# Patient Record
Sex: MALE | Race: WHITE | NOT HISPANIC OR LATINO | URBAN - METROPOLITAN AREA
[De-identification: names, ages, dates, MRNs, and addresses within clinical notes are randomized per-mention and may not be internally consistent; named-entity substitution may affect disease eponyms.]

---

## 2017-03-21 ENCOUNTER — INPATIENT (INPATIENT)
Facility: HOSPITAL | Age: 67
LOS: 2 days | Discharge: HOME HEALTH | End: 2017-03-24
Attending: ORTHOPAEDIC SURGERY

## 2017-06-27 DIAGNOSIS — R05 COUGH: ICD-10-CM

## 2017-06-27 DIAGNOSIS — E03.9 HYPOTHYROIDISM, UNSPECIFIED: ICD-10-CM

## 2017-06-27 DIAGNOSIS — R00.0 TACHYCARDIA, UNSPECIFIED: ICD-10-CM

## 2017-06-27 DIAGNOSIS — Z53.29 PROCEDURE AND TREATMENT NOT CARRIED OUT BECAUSE OF PATIENT'S DECISION FOR OTHER REASONS: ICD-10-CM

## 2017-06-27 DIAGNOSIS — R71.0 PRECIPITOUS DROP IN HEMATOCRIT: ICD-10-CM

## 2017-06-27 DIAGNOSIS — F31.9 BIPOLAR DISORDER, UNSPECIFIED: ICD-10-CM

## 2017-06-27 DIAGNOSIS — M16.11 UNILATERAL PRIMARY OSTEOARTHRITIS, RIGHT HIP: ICD-10-CM

## 2017-06-27 DIAGNOSIS — M10.9 GOUT, UNSPECIFIED: ICD-10-CM

## 2017-06-27 DIAGNOSIS — I10 ESSENTIAL (PRIMARY) HYPERTENSION: ICD-10-CM

## 2017-06-27 DIAGNOSIS — Z87.891 PERSONAL HISTORY OF NICOTINE DEPENDENCE: ICD-10-CM

## 2017-06-27 DIAGNOSIS — M48.00 SPINAL STENOSIS, SITE UNSPECIFIED: ICD-10-CM

## 2018-05-29 PROBLEM — Z00.00 ENCOUNTER FOR PREVENTIVE HEALTH EXAMINATION: Status: ACTIVE | Noted: 2018-05-29

## 2018-06-14 ENCOUNTER — APPOINTMENT (OUTPATIENT)
Dept: PLASTIC SURGERY | Facility: CLINIC | Age: 68
End: 2018-06-14
Payer: MEDICARE

## 2018-06-14 ENCOUNTER — FORM ENCOUNTER (OUTPATIENT)
Age: 68
End: 2018-06-14

## 2018-06-14 VITALS — HEIGHT: 70 IN | BODY MASS INDEX: 26.48 KG/M2 | WEIGHT: 185 LBS

## 2018-06-14 DIAGNOSIS — M19.90 UNSPECIFIED OSTEOARTHRITIS, UNSPECIFIED SITE: ICD-10-CM

## 2018-06-14 PROCEDURE — 99203 OFFICE O/P NEW LOW 30 MIN: CPT

## 2018-06-15 ENCOUNTER — OUTPATIENT (OUTPATIENT)
Dept: OUTPATIENT SERVICES | Facility: HOSPITAL | Age: 68
LOS: 1 days | Discharge: HOME | End: 2018-06-15

## 2018-06-15 DIAGNOSIS — M19.90 UNSPECIFIED OSTEOARTHRITIS, UNSPECIFIED SITE: ICD-10-CM

## 2018-07-17 ENCOUNTER — APPOINTMENT (OUTPATIENT)
Dept: PLASTIC SURGERY | Facility: CLINIC | Age: 68
End: 2018-07-17
Payer: MEDICARE

## 2018-07-17 PROCEDURE — 99212 OFFICE O/P EST SF 10 MIN: CPT

## 2018-09-04 ENCOUNTER — APPOINTMENT (OUTPATIENT)
Dept: PLASTIC SURGERY | Facility: CLINIC | Age: 68
End: 2018-09-04

## 2023-06-18 ENCOUNTER — INPATIENT (INPATIENT)
Facility: HOSPITAL | Age: 73
LOS: 0 days | Discharge: HOME | DRG: 69 | End: 2023-06-19
Attending: STUDENT IN AN ORGANIZED HEALTH CARE EDUCATION/TRAINING PROGRAM | Admitting: PSYCHIATRY & NEUROLOGY
Payer: MEDICARE

## 2023-06-18 ENCOUNTER — TRANSCRIPTION ENCOUNTER (OUTPATIENT)
Age: 73
End: 2023-06-18

## 2023-06-18 VITALS
TEMPERATURE: 99 F | SYSTOLIC BLOOD PRESSURE: 161 MMHG | RESPIRATION RATE: 20 BRPM | WEIGHT: 172.4 LBS | OXYGEN SATURATION: 96 % | HEART RATE: 64 BPM | DIASTOLIC BLOOD PRESSURE: 77 MMHG

## 2023-06-18 DIAGNOSIS — I63.9 CEREBRAL INFARCTION, UNSPECIFIED: ICD-10-CM

## 2023-06-18 DIAGNOSIS — Z98.890 OTHER SPECIFIED POSTPROCEDURAL STATES: Chronic | ICD-10-CM

## 2023-06-18 LAB
ALBUMIN SERPL ELPH-MCNC: 4.4 G/DL — SIGNIFICANT CHANGE UP (ref 3.5–5.2)
ALP SERPL-CCNC: 78 U/L — SIGNIFICANT CHANGE UP (ref 30–115)
ALT FLD-CCNC: 19 U/L — SIGNIFICANT CHANGE UP (ref 0–41)
AMMONIA BLD-MCNC: 19 UMOL/L — SIGNIFICANT CHANGE UP (ref 11–55)
ANION GAP SERPL CALC-SCNC: 10 MMOL/L — SIGNIFICANT CHANGE UP (ref 7–14)
APTT BLD: 38.5 SEC — SIGNIFICANT CHANGE UP (ref 27–39.2)
AST SERPL-CCNC: 25 U/L — SIGNIFICANT CHANGE UP (ref 0–41)
BASOPHILS # BLD AUTO: 0.05 K/UL — SIGNIFICANT CHANGE UP (ref 0–0.2)
BASOPHILS NFR BLD AUTO: 0.6 % — SIGNIFICANT CHANGE UP (ref 0–1)
BILIRUB SERPL-MCNC: 0.4 MG/DL — SIGNIFICANT CHANGE UP (ref 0.2–1.2)
BUN SERPL-MCNC: 16 MG/DL — SIGNIFICANT CHANGE UP (ref 10–20)
CALCIUM SERPL-MCNC: 9.3 MG/DL — SIGNIFICANT CHANGE UP (ref 8.4–10.4)
CHLORIDE SERPL-SCNC: 103 MMOL/L — SIGNIFICANT CHANGE UP (ref 98–110)
CO2 SERPL-SCNC: 25 MMOL/L — SIGNIFICANT CHANGE UP (ref 17–32)
CREAT SERPL-MCNC: 0.9 MG/DL — SIGNIFICANT CHANGE UP (ref 0.7–1.5)
EGFR: 92 ML/MIN/1.73M2 — SIGNIFICANT CHANGE UP
EOSINOPHIL # BLD AUTO: 0.26 K/UL — SIGNIFICANT CHANGE UP (ref 0–0.7)
EOSINOPHIL NFR BLD AUTO: 3 % — SIGNIFICANT CHANGE UP (ref 0–8)
ETHANOL SERPL-MCNC: <10 MG/DL — SIGNIFICANT CHANGE UP
GLUCOSE SERPL-MCNC: 99 MG/DL — SIGNIFICANT CHANGE UP (ref 70–99)
HCT VFR BLD CALC: 44.6 % — SIGNIFICANT CHANGE UP (ref 42–52)
HGB BLD-MCNC: 15.3 G/DL — SIGNIFICANT CHANGE UP (ref 14–18)
IMM GRANULOCYTES NFR BLD AUTO: 0.3 % — SIGNIFICANT CHANGE UP (ref 0.1–0.3)
INR BLD: 1.06 RATIO — SIGNIFICANT CHANGE UP (ref 0.65–1.3)
LACTATE SERPL-SCNC: 1 MMOL/L — SIGNIFICANT CHANGE UP (ref 0.7–2)
LYMPHOCYTES # BLD AUTO: 0.91 K/UL — LOW (ref 1.2–3.4)
LYMPHOCYTES # BLD AUTO: 10.5 % — LOW (ref 20.5–51.1)
MCHC RBC-ENTMCNC: 30.7 PG — SIGNIFICANT CHANGE UP (ref 27–31)
MCHC RBC-ENTMCNC: 34.3 G/DL — SIGNIFICANT CHANGE UP (ref 32–37)
MCV RBC AUTO: 89.6 FL — SIGNIFICANT CHANGE UP (ref 80–94)
MONOCYTES # BLD AUTO: 0.53 K/UL — SIGNIFICANT CHANGE UP (ref 0.1–0.6)
MONOCYTES NFR BLD AUTO: 6.1 % — SIGNIFICANT CHANGE UP (ref 1.7–9.3)
NEUTROPHILS # BLD AUTO: 6.85 K/UL — HIGH (ref 1.4–6.5)
NEUTROPHILS NFR BLD AUTO: 79.5 % — HIGH (ref 42.2–75.2)
NRBC # BLD: 0 /100 WBCS — SIGNIFICANT CHANGE UP (ref 0–0)
PLATELET # BLD AUTO: 280 K/UL — SIGNIFICANT CHANGE UP (ref 130–400)
PMV BLD: 9.4 FL — SIGNIFICANT CHANGE UP (ref 7.4–10.4)
POTASSIUM SERPL-MCNC: 4.2 MMOL/L — SIGNIFICANT CHANGE UP (ref 3.5–5)
POTASSIUM SERPL-SCNC: 4.2 MMOL/L — SIGNIFICANT CHANGE UP (ref 3.5–5)
PROT SERPL-MCNC: 7 G/DL — SIGNIFICANT CHANGE UP (ref 6–8)
PROTHROM AB SERPL-ACNC: 12.1 SEC — SIGNIFICANT CHANGE UP (ref 9.95–12.87)
RBC # BLD: 4.98 M/UL — SIGNIFICANT CHANGE UP (ref 4.7–6.1)
RBC # FLD: 12.6 % — SIGNIFICANT CHANGE UP (ref 11.5–14.5)
SODIUM SERPL-SCNC: 138 MMOL/L — SIGNIFICANT CHANGE UP (ref 135–146)
TROPONIN T SERPL-MCNC: <0.01 NG/ML — SIGNIFICANT CHANGE UP
WBC # BLD: 8.63 K/UL — SIGNIFICANT CHANGE UP (ref 4.8–10.8)
WBC # FLD AUTO: 8.63 K/UL — SIGNIFICANT CHANGE UP (ref 4.8–10.8)

## 2023-06-18 PROCEDURE — 0042T: CPT | Mod: MA

## 2023-06-18 PROCEDURE — 70551 MRI BRAIN STEM W/O DYE: CPT | Mod: 26

## 2023-06-18 PROCEDURE — 83036 HEMOGLOBIN GLYCOSYLATED A1C: CPT

## 2023-06-18 PROCEDURE — 80061 LIPID PANEL: CPT

## 2023-06-18 PROCEDURE — 81003 URINALYSIS AUTO W/O SCOPE: CPT

## 2023-06-18 PROCEDURE — 80307 DRUG TEST PRSMV CHEM ANLYZR: CPT | Mod: 59

## 2023-06-18 PROCEDURE — 80048 BASIC METABOLIC PNL TOTAL CA: CPT

## 2023-06-18 PROCEDURE — 80349 CANNABINOIDS NATURAL: CPT

## 2023-06-18 PROCEDURE — 86803 HEPATITIS C AB TEST: CPT

## 2023-06-18 PROCEDURE — 36415 COLL VENOUS BLD VENIPUNCTURE: CPT

## 2023-06-18 PROCEDURE — 70498 CT ANGIOGRAPHY NECK: CPT | Mod: 26,MA

## 2023-06-18 PROCEDURE — 80354 DRUG SCREENING FENTANYL: CPT

## 2023-06-18 PROCEDURE — 70551 MRI BRAIN STEM W/O DYE: CPT | Mod: MG

## 2023-06-18 PROCEDURE — 70496 CT ANGIOGRAPHY HEAD: CPT | Mod: 26,MA

## 2023-06-18 PROCEDURE — 93306 TTE W/DOPPLER COMPLETE: CPT

## 2023-06-18 PROCEDURE — 99291 CRITICAL CARE FIRST HOUR: CPT | Mod: GC

## 2023-06-18 PROCEDURE — 93010 ELECTROCARDIOGRAM REPORT: CPT

## 2023-06-18 PROCEDURE — G1004: CPT

## 2023-06-18 PROCEDURE — 84443 ASSAY THYROID STIM HORMONE: CPT

## 2023-06-18 PROCEDURE — 71045 X-RAY EXAM CHEST 1 VIEW: CPT | Mod: 26

## 2023-06-18 PROCEDURE — 92610 EVALUATE SWALLOWING FUNCTION: CPT | Mod: GN

## 2023-06-18 RX ORDER — ENOXAPARIN SODIUM 100 MG/ML
40 INJECTION SUBCUTANEOUS EVERY 24 HOURS
Refills: 0 | Status: DISCONTINUED | OUTPATIENT
Start: 2023-06-18 | End: 2023-06-19

## 2023-06-18 RX ORDER — SERTRALINE 25 MG/1
1 TABLET, FILM COATED ORAL
Refills: 0 | DISCHARGE

## 2023-06-18 RX ORDER — ASPIRIN/CALCIUM CARB/MAGNESIUM 324 MG
325 TABLET ORAL ONCE
Refills: 0 | Status: COMPLETED | OUTPATIENT
Start: 2023-06-18 | End: 2023-06-18

## 2023-06-18 RX ORDER — ATORVASTATIN CALCIUM 80 MG/1
80 TABLET, FILM COATED ORAL AT BEDTIME
Refills: 0 | Status: DISCONTINUED | OUTPATIENT
Start: 2023-06-18 | End: 2023-06-19

## 2023-06-18 RX ORDER — LEVOTHYROXINE SODIUM 125 MCG
1 TABLET ORAL
Refills: 0 | DISCHARGE

## 2023-06-18 RX ORDER — THIAMINE MONONITRATE (VIT B1) 100 MG
100 TABLET ORAL DAILY
Refills: 0 | Status: DISCONTINUED | OUTPATIENT
Start: 2023-06-18 | End: 2023-06-19

## 2023-06-18 RX ORDER — ASPIRIN/CALCIUM CARB/MAGNESIUM 324 MG
81 TABLET ORAL DAILY
Refills: 0 | Status: DISCONTINUED | OUTPATIENT
Start: 2023-06-19 | End: 2023-06-19

## 2023-06-18 RX ORDER — TAMSULOSIN HYDROCHLORIDE 0.4 MG/1
1 CAPSULE ORAL
Refills: 0 | DISCHARGE

## 2023-06-18 RX ORDER — CLOPIDOGREL BISULFATE 75 MG/1
300 TABLET, FILM COATED ORAL ONCE
Refills: 0 | Status: COMPLETED | OUTPATIENT
Start: 2023-06-18 | End: 2023-06-18

## 2023-06-18 RX ORDER — CLOPIDOGREL BISULFATE 75 MG/1
75 TABLET, FILM COATED ORAL DAILY
Refills: 0 | Status: DISCONTINUED | OUTPATIENT
Start: 2023-06-19 | End: 2023-06-19

## 2023-06-18 RX ADMIN — ATORVASTATIN CALCIUM 80 MILLIGRAM(S): 80 TABLET, FILM COATED ORAL at 21:41

## 2023-06-18 RX ADMIN — CLOPIDOGREL BISULFATE 300 MILLIGRAM(S): 75 TABLET, FILM COATED ORAL at 17:00

## 2023-06-18 RX ADMIN — Medication 100 MILLIGRAM(S): at 21:41

## 2023-06-18 RX ADMIN — Medication 325 MILLIGRAM(S): at 16:59

## 2023-06-18 NOTE — DISCHARGE NOTE NURSING/CASE MANAGEMENT/SOCIAL WORK - PATIENT PORTAL LINK FT
You can access the FollowMyHealth Patient Portal offered by [facility] by registering at the following website: [email or web address]. By joining [employer]’s FollowMyHealth portal, you will also be able to view your health information using other applications (apps) compatible with our system.

## 2023-06-18 NOTE — ED PROVIDER NOTE - PHYSICAL EXAMINATION
CONSTITUTIONAL: Well-developed; well-nourished; in no acute distress, nontoxic appearing  SKIN: skin exam is warm and dry,  HEAD: Normocephalic; atraumatic.  EYES:  conjunctiva and sclera clear.  ENT: MMM   NECK:  ROM intact.  CARD: S1, S2 normal, no murmur  RESP: No increased WOB   EXT: Normal ROM   NEURO: awake, alert, aphasia, +NIH 3.

## 2023-06-18 NOTE — ED ADULT NURSE NOTE - NSFALLHARMRISKINTERV_ED_ALL_ED
Assistance OOB with selected safe patient handling equipment if applicable/Assistance with ambulation/Communicate risk of Fall with Harm to all staff, patient, and family/Encourage patient to sit up slowly, dangle for a short time, stand at bedside before walking/Monitor gait and stability/Monitor for mental status changes and reorient to person, place, and time, as needed/Move patient closer to nursing station/within visual sight of ED staff/Provide visual cue: red socks, yellow wristband, yellow gown, etc/Reinforce activity limits and safety measures with patient and family/Review medications for side effects contributing to fall risk/Toileting schedule using arm’s reach rule for commode and bathroom/Use of alarms - bed, stretcher, chair and/or video monitoring/Bed in lowest position, wheels locked, appropriate side rails in place/Call bell, personal items and telephone in reach/Instruct patient to call for assistance before getting out of bed/chair/stretcher/Non-slip footwear applied when patient is off stretcher/[place] to call system/Physically safe environment - no spills, clutter or unnecessary equipment/Purposeful Proactive Rounding/Room/bathroom lighting operational, light cord in reach Assistance OOB with selected safe patient handling equipment if applicable/Assistance with ambulation/Communicate risk of Fall with Harm to all staff, patient, and family/Monitor gait and stability/Monitor for mental status changes and reorient to person, place, and time, as needed/Move patient closer to nursing station/within visual sight of ED staff/Provide visual cue: red socks, yellow wristband, yellow gown, etc/Reinforce activity limits and safety measures with patient and family/Toileting schedule using arm’s reach rule for commode and bathroom/Use of alarms - bed, stretcher, chair and/or video monitoring/Bed in lowest position, wheels locked, appropriate side rails in place/Call bell, personal items and telephone in reach/Instruct patient to call for assistance before getting out of bed/chair/stretcher/Non-slip footwear applied when patient is off stretcher/[place] to call system/Physically safe environment - no spills, clutter or unnecessary equipment/Purposeful Proactive Rounding/Room/bathroom lighting operational, light cord in reach

## 2023-06-18 NOTE — ED PROVIDER NOTE - CLINICAL SUMMARY MEDICAL DECISION MAKING FREE TEXT BOX
72-year-old male presented to the emergency department as a stroke notification.  Patient had CT which was concerning for stroke he was consulted and recommended admission for further evaluation and management.  Patient is out of the window for tPA.

## 2023-06-18 NOTE — H&P ADULT - NSHPPHYSICALEXAM_GEN_ALL_CORE
Cognitive:  Alert, awake, was not able to answer the month, year or his age, but he said his name, and he is a hospital. He has moderate expressive aphasia with paraphasic errors.     Cranial Nerves:  II: Full to confrontation. III/IV/VI: PERRL EOMF No nystagmus  V1V2V3: Symmetric, VII: Face appears symmetric VIII: Normal to screening, IX/X: Palate Elevates Symmetrical  XI: Trapezius Symmetric  XII: Tongue midline  Motor:  Power: 5/5 throughout, tone: normal x 4 limbs, no tremor   Sensation:  Intact to light touch. Intact to pinprick/temperature and vibration. No neglect  Coordination: Finger-nose-finger intact.  Reflexes:  DTR: 2+ symmetric all 4 limbs, no clonus  Plantar responses: Down bilaterally  Gait: Normal

## 2023-06-18 NOTE — H&P ADULT - ASSESSMENT
This 72y Male with PMH of hypothyroidism, HTN presenting with aphasia. The patient acute symptoms suggest stroke. CTH was not significant, CTP showed perfusion restriction in the Lt MCA territory, CTA no LVO. He was out of the window for Tenecteplase. No intervention. Intoxication is less likely      Neuro  #Stroke workup  - Load with aspirin 325 mg, and clopidogrel 300 mg   - continue aspirin 81mg and plavix 75mg daily  - continue atorvastatin 80mg daily  - q4hr stroke neuro checks and vitals  - obtain MRI Brain without contrast  - Stroke Code HCT Results: reviewed   - Stroke Code CTA Results: reviewed   - Stroke education  - Thiamin 100 mg daily   - Acohol level  - Utox     Cards  #HTN  - permissive hypertension, Goal -220  - hold home blood pressure medication for now  - obtain TTE with bubble  - Stroke Code EKG Results: reviewed     #HLD  - high dose statin as above in CVA  - LDL results: pending     Pulm  - call provider if SPO2 < 94%    GI  #Nutrition/Fluids/Electrolytes   - replete K<4 and Mg <2  - Diet: pending swallow assessement   - IVF: none     Renal  - Daily BMP    Infectious Disease  - Stroke Code CXR results:     Endocrine  - A1C results: pending     - TSH results: pending     DVT Prophylaxis  - lovenox sq for DVT prophylaxis   - SCDs for DVT prophylaxis          Discussed daily hospital plans and goals with patient and family at bedside.     Discussed with Neurology Attending

## 2023-06-18 NOTE — STROKE CODE NOTE - CT PERFORMED
Dr. Audi Pinto, we have 1- 2mg Ozempic and 1(7 tablets)- 10 mg jardiance. Is this okay to give to pt?
Pending samples for signature
18-Jun-2023 14:59

## 2023-06-18 NOTE — ED ADULT NURSE NOTE - OBJECTIVE STATEMENT
pt brought to ED by son for headache, vomiting, pt has a new onset of disorientation, unable to recognize his son, pt has difficulty speaking, believes we are in the year 1936. as per son, Pt is able to state his name and . pt began to become confused at 12:15 this afternoon.

## 2023-06-18 NOTE — H&P ADULT - HISTORY OF PRESENT ILLNESS
This 72y old male with PMH of hypothyroidism, alcoholic, presented with confusion. As per the son, he came to pick him he noticed that he is not able to finish his sentences, and he was finding difficulty opening the car door. As per the wife, the patient woke up around 7:30 am, and he was normal, about 9:30 am was the last time he had a conversation with her, then about 12:30 pm when the son came to pick him he noticed the problem.   No previous similar history   He denied weakness, numbness, blurry vision, head or neck trauma

## 2023-06-18 NOTE — ED PROVIDER NOTE - OBJECTIVE STATEMENT
72 year old male, past medical history hypothyroidism, hyperlipidemia, who presents with ams. patient with increased stress, noted to be c/o headache at 1215 today prompting ed eval. patient had witnessed unsteady gait and difficulty opening door handle by son. no fever, shortness of breath, vomiting, diarrhea, rash.

## 2023-06-18 NOTE — H&P ADULT - ATTENDING COMMENTS
Pt is a 73 yo M with PMhx EtOH use (reports h/o heavy use, now ~3 beers a week), former tobacco use, HTN, hypothyroidism, who presents with transient aphasia and right hand clumsiness. Now back to baseline, NIHSS 0. Note some b/l tremulousness and pt is diaphoretic. CTA head/neck without significant flow limiting stenosis. MRI brain negative for acute ischemia.    Impr: TIA  TTE pending  May downgrade to 3E   PTA: none-> DAPT x 21 days then ASA monotherapy in addition to high intensity statin.  [facility] protocol  Dispo- likely home once echo completed

## 2023-06-18 NOTE — H&P ADULT - NSHPLABSRESULTS_GEN_ALL_CORE
LABS:  cret                        15.3   8.63  )-----------( 280      ( 18 Jun 2023 15:05 )             44.6     06-18    138  |  103  |  16  ----------------------------<  99  4.2   |  25  |  0.9    Ca    9.3      18 Jun 2023 15:05    TPro  7.0  /  Alb  4.4  /  TBili  0.4  /  DBili  x   /  AST  25  /  ALT  19  /  AlkPhos  78  06-18    PT/INR - ( 18 Jun 2023 15:05 )   PT: 12.10 sec;   INR: 1.06 ratio         PTT - ( 18 Jun 2023 15:05 )  PTT:38.5 sec

## 2023-06-18 NOTE — ED ADULT TRIAGE NOTE - CHIEF COMPLAINT QUOTE
pt brought to ED by son for headache, vomiting, pt has a new onset of disorientation, unable to recognize his son, pt has difficulty speaking, believes we are in the year 1938. as per son, pt began to become confused at 12:15 this afternoon

## 2023-06-18 NOTE — ED PROVIDER NOTE - ATTENDING CONTRIBUTION TO CARE
72-year-old male with past medical history of hypothyroidism and hyperlipidemia presents emergency department for altered mental status.  Patient's father is 98 his die in the hospital and said he has been much more stressed.  According to the son when he went to pick him up at 1215 he was more anxious complaining of a headache.  They were walking to the car and his father seemed unsteady and was unable to open the door handle.  Patient is normally alert and oriented x3. Son states that patient is tremulous.     Eyes: PERRL, no conjunctival injection  HENT:  Neck supple without meningismus   CV: RRR, Warm, well-perfused extremities  RESP: CTA B/L, no tachypnea   GI: soft, non-tender, non-distended  MSK: No gross deformities appreciated  Skin: Warm, dry. No rashes  Neuro: Alert, CNs II-XII grossly intact. Sensation and motor function of extremities grossly intact.  Psych: Appropriate mood and affect.

## 2023-06-18 NOTE — PATIENT PROFILE ADULT - FUNCTIONAL ASSESSMENT - BASIC MOBILITY 6.
3-calculated by average/Not able to assess (calculate score using [facility] averaging method)

## 2023-06-18 NOTE — PATIENT PROFILE ADULT - FALL HARM RISK - HARM RISK INTERVENTIONS
Assistance with ambulation/Assistance OOB with selected safe patient handling equipment/Communicate Risk of Fall with Harm to all staff/Discuss with provider need for PT consult/Monitor gait and stability/Reinforce activity limits and safety measures with patient and family/Tailored Fall Risk Interventions/Visual Cue: Yellow wristband and red socks/Bed in lowest position, wheels locked, appropriate side rails in place/Call bell, personal items and telephone in reach/Instruct patient to call for assistance before getting out of bed or chair/Non-slip footwear when patient is out of bed/[place] to call system/Physically safe environment - no spills, clutter or unnecessary equipment/Purposeful Proactive Rounding/Room/bathroom lighting operational, light cord in reach

## 2023-06-19 ENCOUNTER — TRANSCRIPTION ENCOUNTER (OUTPATIENT)
Age: 73
End: 2023-06-19

## 2023-06-19 VITALS
TEMPERATURE: 98 F | SYSTOLIC BLOOD PRESSURE: 118 MMHG | HEART RATE: 70 BPM | OXYGEN SATURATION: 97 % | RESPIRATION RATE: 18 BRPM | DIASTOLIC BLOOD PRESSURE: 76 MMHG

## 2023-06-19 LAB
A1C WITH ESTIMATED AVERAGE GLUCOSE RESULT: 5.6 % — SIGNIFICANT CHANGE UP (ref 4–5.6)
AMPHET UR-MCNC: NEGATIVE — SIGNIFICANT CHANGE UP
ANION GAP SERPL CALC-SCNC: 9 MMOL/L — SIGNIFICANT CHANGE UP (ref 7–14)
APPEARANCE UR: CLEAR — SIGNIFICANT CHANGE UP
BARBITURATES UR SCN-MCNC: NEGATIVE — SIGNIFICANT CHANGE UP
BENZODIAZ UR-MCNC: NEGATIVE — SIGNIFICANT CHANGE UP
BILIRUB UR-MCNC: NEGATIVE — SIGNIFICANT CHANGE UP
BUN SERPL-MCNC: 11 MG/DL — SIGNIFICANT CHANGE UP (ref 10–20)
CALCIUM SERPL-MCNC: 9 MG/DL — SIGNIFICANT CHANGE UP (ref 8.4–10.4)
CHLORIDE SERPL-SCNC: 106 MMOL/L — SIGNIFICANT CHANGE UP (ref 98–110)
CHOLEST SERPL-MCNC: 219 MG/DL — HIGH
CO2 SERPL-SCNC: 24 MMOL/L — SIGNIFICANT CHANGE UP (ref 17–32)
COCAINE METAB.OTHER UR-MCNC: NEGATIVE — SIGNIFICANT CHANGE UP
COLOR SPEC: SIGNIFICANT CHANGE UP
CREAT SERPL-MCNC: 0.9 MG/DL — SIGNIFICANT CHANGE UP (ref 0.7–1.5)
DIFF PNL FLD: NEGATIVE — SIGNIFICANT CHANGE UP
DRUG SCREEN 1, URINE RESULT: SIGNIFICANT CHANGE UP
EGFR: 92 ML/MIN/1.73M2 — SIGNIFICANT CHANGE UP
ESTIMATED AVERAGE GLUCOSE: 114 MG/DL — SIGNIFICANT CHANGE UP (ref 68–114)
FENTANYL UR QL: NEGATIVE — SIGNIFICANT CHANGE UP
GLUCOSE SERPL-MCNC: 104 MG/DL — HIGH (ref 70–99)
GLUCOSE UR QL: NEGATIVE — SIGNIFICANT CHANGE UP
HCV AB S/CO SERPL IA: 0.04 COI — SIGNIFICANT CHANGE UP
HCV AB SERPL-IMP: SIGNIFICANT CHANGE UP
HDLC SERPL-MCNC: 51 MG/DL — SIGNIFICANT CHANGE UP
KETONES UR-MCNC: NEGATIVE — SIGNIFICANT CHANGE UP
LEUKOCYTE ESTERASE UR-ACNC: NEGATIVE — SIGNIFICANT CHANGE UP
LIPID PNL WITH DIRECT LDL SERPL: 139 MG/DL — HIGH
METHADONE UR-MCNC: NEGATIVE — SIGNIFICANT CHANGE UP
NITRITE UR-MCNC: NEGATIVE — SIGNIFICANT CHANGE UP
NON HDL CHOLESTEROL: 168 MG/DL — HIGH
OPIATES UR-MCNC: NEGATIVE — SIGNIFICANT CHANGE UP
OXYCODONE UR-MCNC: NEGATIVE — SIGNIFICANT CHANGE UP
PCP UR-MCNC: NEGATIVE — SIGNIFICANT CHANGE UP
PH UR: 6 — SIGNIFICANT CHANGE UP (ref 5–8)
POTASSIUM SERPL-MCNC: 3.9 MMOL/L — SIGNIFICANT CHANGE UP (ref 3.5–5)
POTASSIUM SERPL-SCNC: 3.9 MMOL/L — SIGNIFICANT CHANGE UP (ref 3.5–5)
PROPOXYPHENE QUALITATIVE URINE RESULT: NEGATIVE — SIGNIFICANT CHANGE UP
PROT UR-MCNC: NEGATIVE — SIGNIFICANT CHANGE UP
SODIUM SERPL-SCNC: 139 MMOL/L — SIGNIFICANT CHANGE UP (ref 135–146)
SP GR SPEC: 1.01 — SIGNIFICANT CHANGE UP (ref 1.01–1.03)
T3 SERPL-MCNC: 65 NG/DL — LOW (ref 80–200)
T4 AB SER-ACNC: 6.6 UG/DL — SIGNIFICANT CHANGE UP (ref 4.6–12)
T4 FREE SERPL-MCNC: 1.4 NG/DL — SIGNIFICANT CHANGE UP (ref 0.9–1.8)
THC UR QL: POSITIVE
TRIGL SERPL-MCNC: 147 MG/DL — SIGNIFICANT CHANGE UP
TSH SERPL-MCNC: 1.11 UIU/ML — SIGNIFICANT CHANGE UP (ref 0.27–4.2)
TSH SERPL-MCNC: 1.93 UIU/ML — SIGNIFICANT CHANGE UP (ref 0.27–4.2)
UROBILINOGEN FLD QL: SIGNIFICANT CHANGE UP

## 2023-06-19 PROCEDURE — 99223 1ST HOSP IP/OBS HIGH 75: CPT

## 2023-06-19 PROCEDURE — 93306 TTE W/DOPPLER COMPLETE: CPT | Mod: 26

## 2023-06-19 RX ORDER — LEVOTHYROXINE SODIUM 125 MCG
125 TABLET ORAL DAILY
Refills: 0 | Status: DISCONTINUED | OUTPATIENT
Start: 2023-06-19 | End: 2023-06-19

## 2023-06-19 RX ORDER — CLOPIDOGREL BISULFATE 75 MG/1
1 TABLET, FILM COATED ORAL
Qty: 21 | Refills: 0
Start: 2023-06-19 | End: 2023-07-09

## 2023-06-19 RX ORDER — ATORVASTATIN CALCIUM 80 MG/1
1 TABLET, FILM COATED ORAL
Qty: 30 | Refills: 5
Start: 2023-06-19 | End: 2023-12-15

## 2023-06-19 RX ORDER — ASPIRIN/CALCIUM CARB/MAGNESIUM 324 MG
1 TABLET ORAL
Qty: 30 | Refills: 5
Start: 2023-06-19 | End: 2023-12-15

## 2023-06-19 RX ORDER — TAMSULOSIN HYDROCHLORIDE 0.4 MG/1
0.4 CAPSULE ORAL AT BEDTIME
Refills: 0 | Status: DISCONTINUED | OUTPATIENT
Start: 2023-06-19 | End: 2023-06-19

## 2023-06-19 RX ORDER — SERTRALINE 25 MG/1
50 TABLET, FILM COATED ORAL DAILY
Refills: 0 | Status: DISCONTINUED | OUTPATIENT
Start: 2023-06-19 | End: 2023-06-19

## 2023-06-19 RX ORDER — ACETAMINOPHEN 500 MG
650 TABLET ORAL EVERY 8 HOURS
Refills: 0 | Status: DISCONTINUED | OUTPATIENT
Start: 2023-06-19 | End: 2023-06-19

## 2023-06-19 RX ORDER — CLOPIDOGREL BISULFATE 75 MG/1
1 TABLET, FILM COATED ORAL
Qty: 30 | Refills: 5
Start: 2023-06-19 | End: 2023-12-15

## 2023-06-19 RX ADMIN — Medication 81 MILLIGRAM(S): at 11:19

## 2023-06-19 RX ADMIN — ENOXAPARIN SODIUM 40 MILLIGRAM(S): 100 INJECTION SUBCUTANEOUS at 06:30

## 2023-06-19 RX ADMIN — Medication 100 MILLIGRAM(S): at 11:19

## 2023-06-19 RX ADMIN — CLOPIDOGREL BISULFATE 75 MILLIGRAM(S): 75 TABLET, FILM COATED ORAL at 11:19

## 2023-06-19 RX ADMIN — SERTRALINE 50 MILLIGRAM(S): 25 TABLET, FILM COATED ORAL at 11:18

## 2023-06-19 NOTE — DISCHARGE NOTE PROVIDER - CARE PROVIDER_API CALL
Sia Saha  Neurology  [address] [place] [id number]  Phone: [phone]  Fax: [phone]  Follow Up Time:

## 2023-06-19 NOTE — DISCHARGE NOTE PROVIDER - HOSPITAL COURSE
HPI:  This 72y old male with PMH of hypothyroidism, alcoholic, presented with confusion. As per the son, he came to pick him he noticed that he is not able to finish his sentences, and he was finding difficulty opening the car door. As per the wife, the patient woke up around 7:30 am, and he was normal, about 9:30 am was the last time he had a conversation with her, then about 12:30 pm when the son came to pick him he noticed the problem. No previous similar history. He denied weakness, numbness, blurry vision, head or neck trauma.      During this hospital course, his symptoms improved within less than 24 hours and patient wasn't found stroke according to stroke workup, his symptoms most likely related to TIA:       Patient had the following workup done in house:  - CTH: negative  - CTP: perfusion restriction in left MCA territory  - CTA H/N:  - MR Head: negative  - Echo: pending    Core Measures  LDL: 134            A1c:               TSH:      MEDICATIONS  (STANDING):  aspirin enteric coated 81 milliGRAM(s) Oral daily  atorvastatin 80 milliGRAM(s) Oral at bedtime  clopidogrel Tablet 75 milliGRAM(s) Oral daily  enoxaparin Injectable 40 milliGRAM(s) SubCutaneous every 24 hours  levothyroxine 125 MICROGram(s) Oral daily  sertraline 50 milliGRAM(s) Oral daily  tamsulosin 0.4 milliGRAM(s) Oral at bedtime  thiamine 100 milliGRAM(s) Oral daily    MEDICATIONS  (PRN):  acetaminophen     Tablet .. 650 milliGRAM(s) Oral every 8 hours PRN Temp greater or equal to 38C (100.4F), Mild Pain (1 - 3)      Physical exam at discharge:  Physical Exam: Cognitive:  Alert, awake, was not able to answer the month, year or his age, but he said his name, and he is a hospital. He has moderate expressive aphasia with paraphasic errors.     Cranial Nerves:  II: Full to confrontation. III/IV/VI: PERRL EOMF No nystagmus  V1V2V3: Symmetric, VII: Face appears symmetric VIII: Normal to screening, IX/X: Palate Elevates Symmetrical  XI: Trapezius Symmetric  XII: Tongue midline  Motor:  Power: 5/5 throughout, tone: normal x 4 limbs, no tremor   Sensation:  Intact to light touch. Intact to pinprick/temperature and vibration. No neglect  Coordination: Finger-nose-finger intact.  Reflexes:  DTR: 2+ symmetric all 4 limbs, no clonus  Plantar responses: Down bilaterally  Gait: Normal    NIHSS on discharge: 0    New medications on discharge:  Labs to be followed up:  Imaging to be done as outpatient:  Further outpatient workup:   HPI:  This 72y old male with PMH of hypothyroidism, alcoholic, presented with confusion. As per the son, he came to pick him he noticed that he is not able to finish his sentences, and he was finding difficulty opening the car door. As per the wife, the patient woke up around 7:30 am, and he was normal, about 9:30 am was the last time he had a conversation with her, then about 12:30 pm when the son came to pick him he noticed the problem. No previous similar history. He denied weakness, numbness, blurry vision, head or neck trauma.      During this hospital course, his symptoms improved within less than 24 hours and patient wasn't found stroke according to stroke workup, his symptoms most likely related to TIA:       Patient had the following workup done in house:  - CTH: negative for transcortical infarcts of hemorrhages  - CTP: perfusion restriction in left MCA territory  - CTA H/N: negative for flow limiting stenosis or occlusions  - MR Head: negative for acute stroke  - Echo: pending    Core Measures  LDL: 134            A1c: pending               TSH: pending      MEDICATIONS  (STANDING):  aspirin enteric coated 81 milliGRAM(s) Oral daily  atorvastatin 80 milliGRAM(s) Oral at bedtime  clopidogrel Tablet 75 milliGRAM(s) Oral daily  enoxaparin Injectable 40 milliGRAM(s) SubCutaneous every 24 hours  levothyroxine 125 MICROGram(s) Oral daily  sertraline 50 milliGRAM(s) Oral daily  tamsulosin 0.4 milliGRAM(s) Oral at bedtime  thiamine 100 milliGRAM(s) Oral daily    MEDICATIONS  (PRN):  acetaminophen     Tablet .. 650 milliGRAM(s) Oral every 8 hours PRN Temp greater or equal to 38C (100.4F), Mild Pain (1 - 3)      Physical exam at discharge:  Physical Exam: Cognitive:  Alert, awake, was not able to answer the month, year or his age, but he said his name, and he is a hospital. He has moderate expressive aphasia with paraphasic errors.     Cranial Nerves:  II: Full to confrontation. III/IV/VI: PERRL EOMF No nystagmus  V1V2V3: Symmetric, VII: Face appears symmetric VIII: Normal to screening, IX/X: Palate Elevates Symmetrical  XI: Trapezius Symmetric  XII: Tongue midline  Motor:  Power: 5/5 throughout, tone: normal x 4 limbs, no tremor   Sensation:  Intact to light touch. Intact to pinprick/temperature and vibration. No neglect  Coordination: Finger-nose-finger intact.  Reflexes:  DTR: 2+ symmetric all 4 limbs, no clonus  Plantar responses: Down bilaterally  Gait: Normal    NIHSS on discharge: 0    New medications on discharge: ASA 81, Plavix 75, Lipitor 80  Labs to be followed up: none  Imaging to be done as outpatient: none  Further outpatient workup: f/u Neurologist, Cardiologist in OP settings.    HPI:  This 72y old male with PMH of hypothyroidism, alcoholic, presented with confusion. As per the son, he came to pick him he noticed that he is not able to finish his sentences, and he was finding difficulty opening the car door. As per the wife, the patient woke up around 7:30 am, and he was normal, about 9:30 am was the last time he had a conversation with her, then about 12:30 pm when the son came to pick him he noticed the problem. No previous similar history. He denied weakness, numbness, blurry vision, head or neck trauma.      During this hospital course, his symptoms improved within less than 24 hours and patient wasn't found stroke according to stroke workup, his symptoms most likely related to TIA:       Patient had the following workup done in house:  - CTH: negative for transcortical infarcts of hemorrhages.  - CTP: perfusion restriction in left MCA territory  - CTA H/N: negative for flow limiting stenosis or occlusions  - MR Head: negative for acute stroke  - Echo: pending    Core Measures  LDL: 134            A1c: pending               TSH: pending      MEDICATIONS  (STANDING):  aspirin enteric coated 81 milliGRAM(s) Oral daily  atorvastatin 80 milliGRAM(s) Oral at bedtime  clopidogrel Tablet 75 milliGRAM(s) Oral daily  enoxaparin Injectable 40 milliGRAM(s) SubCutaneous every 24 hours  levothyroxine 125 MICROGram(s) Oral daily  sertraline 50 milliGRAM(s) Oral daily  tamsulosin 0.4 milliGRAM(s) Oral at bedtime  thiamine 100 milliGRAM(s) Oral daily    MEDICATIONS  (PRN):  acetaminophen     Tablet .. 650 milliGRAM(s) Oral every 8 hours PRN Temp greater or equal to 38C (100.4F), Mild Pain (1 - 3)      Physical exam at discharge:  Physical Exam: Cognitive:  Alert, awake, was not able to answer the month, year or his age, but he said his name, and he is a hospital. He has moderate expressive aphasia with paraphasic errors.     Cranial Nerves:  II: Full to confrontation. III/IV/VI: PERRL EOMF No nystagmus  V1V2V3: Symmetric, VII: Face appears symmetric VIII: Normal to screening, IX/X: Palate Elevates Symmetrical  XI: Trapezius Symmetric  XII: Tongue midline  Motor:  Power: 5/5 throughout, tone: normal x 4 limbs, no tremor   Sensation:  Intact to light touch. Intact to pinprick/temperature and vibration. No neglect  Coordination: Finger-nose-finger intact.  Reflexes:  DTR: 2+ symmetric all 4 limbs, no clonus  Plantar responses: Down bilaterally  Gait: Normal    NIHSS on discharge: 0    New medications on discharge: ASA 81, Plavix 75, Lipitor 80  Labs to be followed up: none  Imaging to be done as outpatient: none  Further outpatient workup: f/u Neurologist, Cardiologist in OP settings.    HPI:  This 72y old male with PMH of hypothyroidism, alcoholic, presented with confusion. As per the son, he came to pick him he noticed that he is not able to finish his sentences, and he was finding difficulty opening the car door. As per the wife, the patient woke up around 7:30 am, and he was normal, about 9:30 am was the last time he had a conversation with her, then about 12:30 pm when the son came to pick him he noticed the problem. No previous similar history. He denied weakness, numbness, blurry vision, head or neck trauma.      During this hospital course, his symptoms improved within less than 24 hours and patient wasn't found stroke according to stroke workup, his symptoms most likely related to TIA:       Patient had the following workup done in house:  - CTH: negative for transcortical infarcts of hemorrhages.  - CTP: perfusion restriction in left MCA territory  - CTA H/N: negative for flow limiting stenosis or occlusions  - MR Head: negative for acute stroke  - Echo: pending    Core Measures  LDL: 134            A1c: pending               TSH: pending      MEDICATIONS  (STANDING):  aspirin enteric coated 81 milliGRAM(s) Oral daily  atorvastatin 80 milliGRAM(s) Oral at bedtime  clopidogrel Tablet 75 milliGRAM(s) Oral daily  enoxaparin Injectable 40 milliGRAM(s) SubCutaneous every 24 hours  levothyroxine 125 MICROGram(s) Oral daily  sertraline 50 milliGRAM(s) Oral daily  tamsulosin 0.4 milliGRAM(s) Oral at bedtime  thiamine 100 milliGRAM(s) Oral daily    MEDICATIONS  (PRN):  acetaminophen     Tablet .. 650 milliGRAM(s) Oral every 8 hours PRN Temp greater or equal to 38C (100.4F), Mild Pain (1 - 3)      Physical exam at discharge:  Physical Exam: Cognitive:  Alert, awake, was not able to answer the month, year or his age, but he said his name, and he is a hospital. He has moderate expressive aphasia with paraphasic errors.     Cranial Nerves:  II: Full to confrontation. III/IV/VI: PERRL EOMF No nystagmus  V1V2V3: Symmetric, VII: Face appears symmetric VIII: Normal to screening, IX/X: Palate Elevates Symmetrical  XI: Trapezius Symmetric  XII: Tongue midline  Motor:  Power: 5/5 throughout, tone: normal x 4 limbs, no tremor   Sensation:  Intact to light touch. Intact to pinprick/temperature and vibration. No neglect  Coordination: Finger-nose-finger intact.  Reflexes:  DTR: 2+ symmetric all 4 limbs, no clonus  Plantar responses: Down bilaterally  Gait: Normal    NIHSS on discharge: 0    New medications on discharge: ASA 81, Plavix 75, Lipitor 80  Labs to be followed up: none  Imaging to be done as outpatient: none  Further outpatient workup: f/u Neurologist, Cardiologist in OP settings.       Stroke attending attestation:  Pt is a 73 yo M with PMhx EtOH use (reports h/o heavy use, now ~3 beers a week), former tobacco use, HTN, hypothyroidism, who presents with transient aphasia and right hand clumsiness. Now back to baseline, NIHSS 0. Note some b/l tremulousness and pt is diaphoretic. CTA head/neck without significant flow limiting stenosis. MRI brain negative for acute ischemia.    Impr: TIA  TTE pending, likely d/c home once completed   PTA: none-> DAPT x 21 days then ASA monotherapy in addition to high intensity statin.  F/u in stroke clinic an with PCP     HPI:  This 72y old male with PMH of hypothyroidism, alcoholic, presented with confusion. As per the son, he came to pick him he noticed that he is not able to finish his sentences, and he was finding difficulty opening the car door. As per the wife, the patient woke up around 7:30 am, and he was normal, about 9:30 am was the last time he had a conversation with her, then about 12:30 pm when the son came to pick him he noticed the problem. No previous similar history. He denied weakness, numbness, blurry vision, head or neck trauma.      During this hospital course, his symptoms improved within less than 24 hours and patient wasn't found stroke according to stroke workup, his symptoms most likely related to TIA:       Patient had the following workup done in house:  - CTH: negative for transcortical infarcts of hemorrhages.  - CTP: perfusion restriction in left MCA territory  - CTA H/N: negative for flow limiting stenosis or occlusions  - MR Head: negative for acute stroke  - Echo: no PFO, preserved EF    Core Measures  LDL: 134            A1c: pending               TSH: pending      MEDICATIONS  (STANDING):  aspirin enteric coated 81 milliGRAM(s) Oral daily  atorvastatin 80 milliGRAM(s) Oral at bedtime  clopidogrel Tablet 75 milliGRAM(s) Oral daily  enoxaparin Injectable 40 milliGRAM(s) SubCutaneous every 24 hours  levothyroxine 125 MICROGram(s) Oral daily  sertraline 50 milliGRAM(s) Oral daily  tamsulosin 0.4 milliGRAM(s) Oral at bedtime  thiamine 100 milliGRAM(s) Oral daily    MEDICATIONS  (PRN):  acetaminophen     Tablet .. 650 milliGRAM(s) Oral every 8 hours PRN Temp greater or equal to 38C (100.4F), Mild Pain (1 - 3)      Physical exam at discharge:  Physical Exam: Cognitive:  Alert, awake, was not able to answer the month, year or his age, but he said his name, and he is a hospital. He has moderate expressive aphasia with paraphasic errors.     Cranial Nerves:  II: Full to confrontation. III/IV/VI: PERRL EOMF No nystagmus  V1V2V3: Symmetric, VII: Face appears symmetric VIII: Normal to screening, IX/X: Palate Elevates Symmetrical  XI: Trapezius Symmetric  XII: Tongue midline  Motor:  Power: 5/5 throughout, tone: normal x 4 limbs, no tremor   Sensation:  Intact to light touch. Intact to pinprick/temperature and vibration. No neglect  Coordination: Finger-nose-finger intact.  Reflexes:  DTR: 2+ symmetric all 4 limbs, no clonus  Plantar responses: Down bilaterally  Gait: Normal    NIHSS on discharge: 0    New medications on discharge: ASA 81, Plavix 75, Lipitor 80  Labs to be followed up: none  Imaging to be done as outpatient: none  Further outpatient workup: f/u Neurologist, Cardiologist in OP settings.       Stroke attending attestation:  Pt is a 73 yo M with PMhx EtOH use (reports h/o heavy use, now ~3 beers a week), former tobacco use, HTN, hypothyroidism, who presents with transient aphasia and right hand clumsiness. Now back to baseline, NIHSS 0. Note some b/l tremulousness and pt is diaphoretic. CTA head/neck without significant flow limiting stenosis. MRI brain negative for acute ischemia.    Impr: TIA  TTE pending, likely d/c home once completed   PTA: none-> DAPT x 21 days then ASA monotherapy in addition to high intensity statin.  F/u in stroke clinic an with PCP

## 2023-06-19 NOTE — DISCHARGE NOTE PROVIDER - NSDCFUSCHEDAPPT_GEN_ALL_CORE_FT
Unknown, Doctor  HARRISON Aquilino  [facility] Pre[facility]  Scheduled Appointment: 06/28/2023    [facility]  [facility] Ashvin Dooley  Scheduled Appointment: 06/28/2023

## 2023-06-19 NOTE — DISCHARGE NOTE PROVIDER - NSDCCPCAREPLAN_GEN_ALL_CORE_FT
PRINCIPAL DISCHARGE DIAGNOSIS  Diagnosis: TIA (transient ischemic attack)  Assessment and Plan of Treatment: You were admitted to the hospital because you had symptoms of confusion, right sided weakness, speech issues, which resolved. This is called a transient ischemic attack, or TIA. This is when a blood clot temporarily blocks a blood vessel in your brain, but does not last long enough to cause permanent damage in your brain. A TIA is a warning sign of a future stroke, which can permanently damage areas in the brain that control parts of the body. It is important to treat a TIA to prevent strokes.   You have these risks factors of TIA and future strokes. Please see secondary diagnoses for further explanation:    -high cholesterol (also called hyperlipidemia)  -hypothyroidism     Please take your aspirin and plavix for blood thinning and Atorvastatin for cholesterol medication/blood vessel protection as prescribed to prevent further strokes. Do not skip doses and do not run low on your medication. If you run low on your medication, please contact your doctor.  You will follow up outpatient with the stroke Nurse Practitioner/doctor as scheduled below.  Call 911 if you or someone you know experiences the following symptoms of stroke (can be remembered by BE FAST):  •Balance: Dizziness, loss of balance, or a sense of falling  •Eyes: Sudden double vision or blurred vision  •Face: drooping of one side of the face  •Arm: arm weakness  •Speech:  Sudden trouble talking or slurred speech, trouble understanding others  •Time: Time to call for an ambulance fast!        SECONDARY DISCHARGE DIAGNOSES  Diagnosis: HLD (hyperlipidemia)  Assessment and Plan of Treatment:   - c/w Lipitor 80 mg daily  - f/u w Lipid profile  - f/u PCP    Diagnosis: BPH with urinary obstruction  Assessment and Plan of Treatment:   - c/w Tamsulosin 0.4 mg daily    Diagnosis: Reactive depression  Assessment and Plan of Treatment:   -c/w Zoloft 50 mg daily  -f/u w psychiatrist    Diagnosis: Hypothyroidism  Assessment and Plan of Treatment:   -c/w Synthroid 125 mcg daily  -f/u w Endocrinologist

## 2023-06-19 NOTE — SWALLOW BEDSIDE ASSESSMENT ADULT - SLP GENERAL OBSERVATIONS
Pt received at bedside, alert and oriented x4. Fluent speech observed during conversation. No family members present.  Following directions and conversing with examiner appropriately,

## 2023-06-19 NOTE — SWALLOW BEDSIDE ASSESSMENT ADULT - SLP PERTINENT HISTORY OF CURRENT PROBLEM
Medication(s) to Refill:   Requested Prescriptions     Pending Prescriptions Disp Refills   • AMLODIPINE 2.5 MG Oral Tab [Pharmacy Med Name: Amlodipine Besylate 2.5 Mg Tab Asce] 90 tablet 0     Sig: Take 1 tablet (2.5 mg total) by mouth daily.        LOV: 6
72y old male with PMH of hypothyroidism, alcoholic, presented with confusion. As per the son, he came to pick him he noticed that he is not able to finish his sentences, and he was finding difficulty opening the car door. As per the wife, the patient woke up around 7:30 am, and he was normal, about 9:30 am was the last time he had a conversation with her, then about 12:30 pm when the son came to pick him he noticed the problem. CTH was not significant, CTP showed perfusion restriction in the Lt MCA territory, CTA no LVO. He was out of the window for Tenecteplase. No intervention. Intoxication is less likely

## 2023-06-19 NOTE — DISCHARGE NOTE PROVIDER - NSDCMRMEDTOKEN_GEN_ALL_CORE_FT
levothyroxine 125 mcg (0.125 mg) oral tablet: 1 orally once a day  sertraline 50 mg oral tablet: 1 orally once a day  tamsulosin 0.4 mg oral capsule: 1 orally once a day   aspirin 81 mg oral delayed release tablet: 1 tab(s) orally once a day  atorvastatin 80 mg oral tablet: 1 tab(s) orally once a day (at bedtime)  clopidogrel 75 mg oral tablet: 1 tab(s) orally once a day  levothyroxine 125 mcg (0.125 mg) oral tablet: 1 orally once a day  sertraline 50 mg oral tablet: 1 orally once a day  tamsulosin 0.4 mg oral capsule: 1 orally once a day

## 2023-06-19 NOTE — CONSULT NOTE ADULT - ASSESSMENT
IMPRESSION: Rehab of r/o CVA vs TIA / Hypothyroidism, HTN    PRECAUTIONS: [   ] Cardiac  [   ] Respiratory  [   ] Seizures [   ] Contact Isolation  [   ] Droplet Isolation  [   ] Other    Weight Bearing Status:     RECOMMENDATION:    Out of Bed to Chair     DVT/Decubiti Prophylaxis    REHAB PLAN:     [  x  ] Bedside P/T 3-5 times a week   [  x  ]   Bedside O/T  2-3 times a week             [  x  ] Speech Therapy               [    ]  No Rehab Therapy Indicated   Conditioning/ROM                                    ADL  Bed Mobility                                               Conditioning/ROM  Transfers                                                     Bed Mobility  Sitting /Standing Balance                         Transfers                                        Gait Training                                               Sitting/Standing Balance  Stair Training [   ]Applicable                    Home equipment Eval                                                                        Splinting  [   ] Only      GOALS:   ADL   [   x ]   Independent                    Transfers  [   x ] Independent                          Ambulation  [  x  ] Independent     [ x    ] With device                            [    ]  CG                                                         [    ]  CG                                                                  [    ] CG                            [    ] Min A                                                   [    ] Min A                                                              [    ] Min  A          DISCHARGE PLAN:   [    ]  Good candidate for Intensive Rehabilitation/Hospital based                                             Will tolerate 3hrs Intensive Rehab Daily                                       [     ]  Short Term Rehab in Skilled Nursing Facility                                       [  x   ]  Home with Outpatient or  services                                         [     ]  Possible Candidate for Intensive Hospital based Rehab

## 2023-06-19 NOTE — PHYSICAL THERAPY INITIAL EVALUATION ADULT - SPECIFY REASON(S)
Chart reviewed. Pt. undergoing echo at bedside at this time. PT to follow as available and appropriate.

## 2023-06-19 NOTE — CONSULT NOTE ADULT - SUBJECTIVE AND OBJECTIVE BOX
HPI:  This 72y old male with PMH of hypothyroidism, alcoholic, presented with confusion. As per the son, he came to pick him he noticed that he is not able to finish his sentences, and he was finding difficulty opening the car door. As per the wife, the patient woke up around 7:30 am, and he was normal, about 9:30 am was the last time he had a conversation with her, then about 12:30 pm when the son came to pick him he noticed the problem.   No previous similar history   He denied weakness, numbness, blurry vision, head or neck trauma     < from: CT Brain Stroke Protocol (23 @ 14:30) >  ACC: [id number] EXAM:  CT BRAIN STROKE PROTOCOL   ORDERED BY: MONICA MILLER     PROCEDURE DATE:  2023          INTERPRETATION:  CLINICAL INDICATION: Stroke code. Aphasia.    TECHNIQUE: CT of the head was performed without the administration of   intravenous contrast.    COMPARISON: CT head dated 2009.    FINDINGS:    There is increased prominence of the sulci, sylvian fissures, and   ventricles likely reflecting mild diffuse parenchymal volume loss.    There are scattered patchy low attenuations in bilateral periventricular   cerebral white matter consistent with mild chronic microvascular ischemic   changes.    There is no evidence of acute territorial infarct or intracranial   hemorrhage. There is no space-occupying lesion or midline shift.    There is no evidence of hydrocephalus. There are no extra-axial fluid   collections.    The visualized intraorbital contents are normal. The imaged portions of   the paranasal sinuses are aerated. The mastoid air cells are aerated. The  visualized soft tissues and osseous structures appear normal.      IMPRESSION:    No acute intracranial pathology.    Mild chronic microvascular ischemic changes, progressed since 2009.    Communication: The summary of above findings were discussed with readback   confirmation with Dr. Hussein by radiologist Dr. Paul on 2023 at 2:38   PM.    --- End of Report ---      < end of copied text >  < from: CT Angio Brain Stroke Protocol  w/ IV Cont (23 @ 14:59) >  ACC: [id number] EXAM:  CT ANGIO BRAIN STROKE PROTC IC   ORDERED BY: MONICA MILLER     ACC: [id number] EXAM:  CT ANGIO NECK STROKE PROTCL IC   ORDERED BY: MONICA MILLER     ACC: [id number] EXAM:  CT BRAIN PERFUSION MAPS STROKE   ORDERED BY: MONICA MILLER     PROCEDURE DATE:  2023          INTERPRETATION:  Clinical History / Reason for exam: Stroke code. Aphasia.    TECHNIQUE: Following the intravenous administration of 50 cc of Omnipaque   350 IV contrast, serial axial images were obtainedthrough the brain. The   CT perfusion data set was post processed per [facility] protocol   generating color maps of CBF (cerebral blood flow), CBV (cerebral blood   volume), MTT (mean transit time), and Tmax. CT angiography of the   intracranial (head) and extracranial (neck) circulation was then   performed after initiation of an additional bolus of Omnipaque 350.   Multiple contiguous axial images from the skull base to vertex were   acquired. Maximal intensity projection images were additionally included   and reviewed. 3-D reformations of the vasculature were also submitted for   review.    CAROTID STENOSIS REFERENCE: (NASCET = 100x1-(N/D)). N=greatest narrowing.   D=normal distal diameter - MILD = <50% stenosis. - MODERATE = 50-69%   stenosis. - SEVERE = 70-89% stenosis. - HAIRLINE/CRITICAL = 90-99%   stenosis. - OCCLUDED = 100% stenosis.    COMPARISON: No direct comparison is available.    FINDINGS:    PERFUSION:    There is moderate patient motion which may limit diagnostic accuracy.    CBF less than 30% volume: 0 mL  Tmax greater than 6 seconds: 29 mL  Mismatch volume: 29 mL  Mismatch ratio: Infinite.    The CT perfusion study demonstrates apparent mismatch volume of 29 cc   corresponding to regions of the left temporal, left occipital, and right   frontal cerebrum. A portion of the mismatch within the frontal region is   noted to have Tmax of greater than 10 seconds and may be artifactual.    HEAD CTA:    Bilateral intracranial ICA segments are patent without evidence of   aneurysm or vascular malformation.    Bilateral ACAs are patent. Right MCA is patent. Left MCA is patent.    Bilateral vertebral arteries are patent to the vertebrobasilar   confluence. SCA origins are patent. Bilateral PCAs are patent without   evidence of aneurysm or vascular malformation.    Visualized intradural venous sinuses evidence no suspicious filling   defect.    NECK CTA:    Three-vessel aortic arch. Arch vessels are patent.    Bilateral common carotid arteries are patent to the bifurcations. There   is minimal atherosclerotic plaque at the bilateral common carotid   bifurcations. Bilateral ECA and ICA origins evidence no significant   stenosis. There is mild tortuosity of the right cervical ICA. Bilateral   cervical ICAs are otherwise patent and of normal caliber to the   intracranial circulation.    Bilateral vertebral arteries are patent at their origins. There is mild   tortuosity of the right V2 segment. Bilateral vertebral arteries are   otherwise of normal course and caliber to the intracranial circulation.    Mild anterolisthesis of C6 on C7 resulting in moderate bilateral   foraminal stenosis.      IMPRESSION:    PERFUSION:  Mild motion degraded examination.    Small scattered regions of delayed perfusion in the left temporal, left   occipital, and right frontal lobes, likely artifactual. If there is   continued clinical concern for acute stroke, a follow-up MR brain may be   obtained.    HEAD CTA:  No large vessel occlusion, aneurysm, or vascular malformation.    NECK CTA:  No flow-limiting stenosis in the carotid or vertebral arteries.        Dr. Elisha Peter discussed preliminary findings with MALIK HUSSEIN on   2023 3:25 PM with readback.    --- End of Report ---    < end of copied text >    Medical charts / labs / imaging studies     PAST MEDICAL & SURGICAL HISTORY:  Hyperlipidemia      Hypothyroidism      History of hip surgery          Hospital Course:    TODAY'S SUBJECTIVE & REVIEW OF SYMPTOMS:     Constitutional WNL   Cardio WNL   Resp WNL   GI WNL  Heme WNL  Endo WNL  Skin WNL  MSK WNL  Neuro aphasia   Cognitive WNL  Psych WNL      MEDICATIONS  (STANDING):  aspirin enteric coated 81 milliGRAM(s) Oral daily  atorvastatin 80 milliGRAM(s) Oral at bedtime  clopidogrel Tablet 75 milliGRAM(s) Oral daily  enoxaparin Injectable 40 milliGRAM(s) SubCutaneous every 24 hours  levothyroxine 125 MICROGram(s) Oral daily  sertraline 50 milliGRAM(s) Oral daily  tamsulosin 0.4 milliGRAM(s) Oral at bedtime  thiamine 100 milliGRAM(s) Oral daily    MEDICATIONS  (PRN):  acetaminophen     Tablet .. 650 milliGRAM(s) Oral every 8 hours PRN Temp greater or equal to 38C (100.4F), Mild Pain (1 - 3)      FAMILY HISTORY:      Allergies    contrast media (iodine-based) (Unknown)    Intolerances        SOCIAL HISTORY:    [  ] Etoh  [  ] Smoking  [  ] Substance abuse     Home Environment:  [   ] Home Alone  [x   ] Lives with Family  [   ] Home Health Aid    Dwelling:  [   ] Apartment  [  x ] Private House  [   ] Adult Home  [   ] Skilled Nursing Facility      [   ] Short Term  [   ] Long Term  [ x  ] Stairs       Elevator [   ]    FUNCTIONAL STATUS PTA: (Check all that apply)  Ambulation: [  x  ]Independent    [   ] Dependent     [   ] Non-Ambulatory  Assistive Device: [   ] SA Cane  [   ]  Q Cane  [   ] Walker  [   ]  Wheelchair  ADL : [ x  ] Independent  [    ]  Dependent       Vital Signs Last 24 Hrs  T(C): 36.3 (2023 08:00), Max: 37.3 (2023 14:16)  T(F): 97.4 (2023 08:00), Max: 99.2 (2023 14:16)  HR: 70 (2023 08:00) (64 - 93)  BP: 109/79 (2023 08:00) (109/79 - 161/77)  BP(mean): 88 (2023 08:00) (82 - 88)  RR: 18 (2023 08:00) (17 - 20)  SpO2: 96% (2023 08:00) (96% - 97%)    Parameters below as of 2023 04:00  Patient On (Oxygen Delivery Method): room air          PHYSICAL EXAM: Awake & Alert  GENERAL: NAD  HEAD:  Normocephalic  CHEST/LUNG: Clear   HEART: S1S2+  ABDOMEN: Soft, Nontender  EXTREMITIES:  no calf tenderness    NERVOUS SYSTEM:  Cranial Nerves 2-12 intact [ x  ] Abnormal  [   ]  ROM: WFL all extremities [ x  ]  Abnormal [   ]  Motor Strength: WFL all extremities  [  x ]  Abnormal [   ]  Sensation: intact to light touch [ x  ] Abnormal [   ]    FUNCTIONAL STATUS:  Bed Mobility: Independent [   ]  Supervision [ x  ]  Needs Assistance [   ]  N/A [   ]  Transfers: Independent [   ]  Supervision [  x ]  Needs Assistance [   ]  N/A [   ]   Ambulation: Independent [   ]  Supervision [   ]  Needs Assistance [   ]  N/A [   ]  ADL: Independent [   ] Requires Assistance [   ] N/A [   ]      LABS:                        15.3   8.63  )-----------( 280      ( 2023 15:05 )             44.6     06-19    139  |  106  |  11  ----------------------------<  104<H>  3.9   |  24  |  0.9    Ca    9.0      2023 05:25    TPro  7.0  /  Alb  4.4  /  TBili  0.4  /  DBili  x   /  AST  25  /  ALT  19  /  AlkPhos  78  06-18    PT/INR - ( 2023 15:05 )   PT: 12.10 sec;   INR: 1.06 ratio         PTT - ( 2023 15:05 )  PTT:38.5 sec  Urinalysis Basic - ( 2023 11:20 )    Color: Light Yellow / Appearance: Clear / S.013 / pH: x  Gluc: x / Ketone: Negative  / Bili: Negative / Urobili: <2 mg/dL   Blood: x / Protein: Negative / Nitrite: Negative   Leuk Esterase: Negative / RBC: x / WBC x   Sq Epi: x / Non Sq Epi: x / Bacteria: x        RADIOLOGY & ADDITIONAL STUDIES:

## 2023-06-23 LAB
CARBOXYTHC UR CFM-MCNC: 364 NG/ML — SIGNIFICANT CHANGE UP
CARBOXYTHC UR QL SCN: 488 — SIGNIFICANT CHANGE UP
CARBOXYTHC UR QL SCN: 74.6 MG/DL — SIGNIFICANT CHANGE UP
THC CREATININE URINE: 74.6 MG/DL — SIGNIFICANT CHANGE UP
THC METABOLITE/CREAT URINE: 488 — SIGNIFICANT CHANGE UP

## 2023-06-28 ENCOUNTER — OUTPATIENT (OUTPATIENT)
Dept: OUTPATIENT SERVICES | Facility: HOSPITAL | Age: 73
LOS: 1 days | End: 2023-06-28
Payer: MEDICARE

## 2023-06-28 DIAGNOSIS — I10 ESSENTIAL (PRIMARY) HYPERTENSION: ICD-10-CM

## 2023-06-28 DIAGNOSIS — N40.0 BENIGN PROSTATIC HYPERPLASIA WITHOUT LOWER URINARY TRACT SYMPTOMS: ICD-10-CM

## 2023-06-28 DIAGNOSIS — Z87.891 PERSONAL HISTORY OF NICOTINE DEPENDENCE: ICD-10-CM

## 2023-06-28 DIAGNOSIS — Z63.79 OTHER STRESSFUL LIFE EVENTS AFFECTING FAMILY AND HOUSEHOLD: ICD-10-CM

## 2023-06-28 DIAGNOSIS — E03.9 HYPOTHYROIDISM, UNSPECIFIED: ICD-10-CM

## 2023-06-28 DIAGNOSIS — Z79.890 HORMONE REPLACEMENT THERAPY: ICD-10-CM

## 2023-06-28 DIAGNOSIS — R47.01 APHASIA: ICD-10-CM

## 2023-06-28 DIAGNOSIS — R97.20 ELEVATED PROSTATE SPECIFIC ANTIGEN [PSA]: ICD-10-CM

## 2023-06-28 DIAGNOSIS — F10.20 ALCOHOL DEPENDENCE, UNCOMPLICATED: ICD-10-CM

## 2023-06-28 DIAGNOSIS — E78.5 HYPERLIPIDEMIA, UNSPECIFIED: ICD-10-CM

## 2023-06-28 DIAGNOSIS — Z00.8 ENCOUNTER FOR OTHER GENERAL EXAMINATION: ICD-10-CM

## 2023-06-28 DIAGNOSIS — F32.A DEPRESSION, UNSPECIFIED: ICD-10-CM

## 2023-06-28 DIAGNOSIS — Z91.041 RADIOGRAPHIC DYE ALLERGY STATUS: ICD-10-CM

## 2023-06-28 DIAGNOSIS — G45.9 TRANSIENT CEREBRAL ISCHEMIC ATTACK, UNSPECIFIED: ICD-10-CM

## 2023-06-28 DIAGNOSIS — Z98.890 OTHER SPECIFIED POSTPROCEDURAL STATES: Chronic | ICD-10-CM

## 2023-06-28 PROCEDURE — A9579: CPT

## 2023-06-28 PROCEDURE — 72197 MRI PELVIS W/O & W/DYE: CPT | Mod: 26,MH

## 2023-06-28 PROCEDURE — 72197 MRI PELVIS W/O & W/DYE: CPT

## 2023-06-29 DIAGNOSIS — R97.20 ELEVATED PROSTATE SPECIFIC ANTIGEN [PSA]: ICD-10-CM

## 2023-07-18 PROBLEM — E03.9 HYPOTHYROIDISM, UNSPECIFIED: Chronic | Status: ACTIVE | Noted: 2023-06-18

## 2023-07-18 PROBLEM — E78.5 HYPERLIPIDEMIA, UNSPECIFIED: Chronic | Status: ACTIVE | Noted: 2023-06-18

## 2023-07-19 ENCOUNTER — APPOINTMENT (OUTPATIENT)
Dept: NEUROLOGY | Facility: CLINIC | Age: 73
End: 2023-07-19
Payer: MEDICARE

## 2023-07-19 ENCOUNTER — NON-APPOINTMENT (OUTPATIENT)
Age: 73
End: 2023-07-19

## 2023-07-19 VITALS
DIASTOLIC BLOOD PRESSURE: 78 MMHG | WEIGHT: 166 LBS | TEMPERATURE: 97.8 F | HEIGHT: 70 IN | OXYGEN SATURATION: 98 % | BODY MASS INDEX: 23.77 KG/M2 | SYSTOLIC BLOOD PRESSURE: 135 MMHG | HEART RATE: 71 BPM

## 2023-07-19 DIAGNOSIS — G45.9 TRANSIENT CEREBRAL ISCHEMIC ATTACK, UNSPECIFIED: ICD-10-CM

## 2023-07-19 DIAGNOSIS — Z87.891 PERSONAL HISTORY OF NICOTINE DEPENDENCE: ICD-10-CM

## 2023-07-19 PROCEDURE — 99214 OFFICE O/P EST MOD 30 MIN: CPT

## 2023-07-19 RX ORDER — LEVOTHYROXINE SODIUM 0.12 MG/1
125 TABLET ORAL DAILY
Refills: 0 | Status: ACTIVE | COMMUNITY

## 2023-07-19 RX ORDER — ATORVASTATIN CALCIUM 80 MG/1
80 TABLET, FILM COATED ORAL DAILY
Refills: 0 | Status: ACTIVE | COMMUNITY

## 2023-07-19 RX ORDER — SERTRALINE HYDROCHLORIDE 100 MG/1
100 TABLET, FILM COATED ORAL DAILY
Refills: 0 | Status: ACTIVE | COMMUNITY

## 2023-07-19 RX ORDER — ASPIRIN ENTERIC COATED TABLETS 81 MG 81 MG/1
81 TABLET, DELAYED RELEASE ORAL DAILY
Refills: 0 | Status: ACTIVE | COMMUNITY

## 2023-07-19 RX ORDER — CLOPIDOGREL BISULFATE 75 MG/1
75 TABLET, FILM COATED ORAL DAILY
Refills: 0 | Status: DISCONTINUED | COMMUNITY
End: 2023-07-19

## 2023-07-19 NOTE — DISCUSSION/SUMMARY
[FreeTextEntry1] : Pt is a 74 yo M with PMhx of alcohol dependence, hypothyroidism, anxiety, HTN, former smoker, who presents for TIA f/u. \par \par # TIA: transient aphasia and right hand clumsiness.  MRI brain was negative, CTA head/neck without significant flow limiting stenosis. NIHSS 0, mRS 0. \par - PTA: none. Continue ASA 81mg daily\par - Continue atorvastatin 80mg QHS. Recheck CMP and lipid panel in 3 mo\par - Discussed importance of alcohol cessation and recommend Cardiology f/u (has extensive cardiac family history)\par \par RTC in 5 mo

## 2023-07-19 NOTE — HISTORY OF PRESENT ILLNESS
[FreeTextEntry1] : Pt is a 72 yo M with PMhx of alcohol dependence, hypothyroidism, anxiety, HTN, former smoker, who presents for TIA f/u. Pt had presented to [facility] in 06/2023 with transient speech difficulty and right hand clumsiness. MRI brain was negative, CTA head/neck without significant flow limiting stenosis. Pt was started on ATP and statin. Denies recurrence of spell or new weakness, numbness, speech or vision difficulty. Endorses having significantly decreased alcohol use, down to 1-2 beers weekly.

## 2023-07-19 NOTE — PHYSICAL EXAM
[General Appearance - Alert] : alert [General Appearance - In No Acute Distress] : in no acute distress [General Appearance - Well Nourished] : well nourished [General Appearance - Well Developed] : well developed [Oriented To Time, Place, And Person] : oriented to person, place, and time [Affect] : the affect was normal [Person] : oriented to person [Place] : oriented to place [Time] : oriented to time [Fluency] : fluency intact [Comprehension] : comprehension intact [Cranial Nerves Optic (II)] : visual acuity intact bilaterally,  visual fields full to confrontation, pupils equal round and reactive to light [Cranial Nerves Oculomotor (III)] : extraocular motion intact [Cranial Nerves Trigeminal (V)] : facial sensation intact symmetrically [Cranial Nerves Facial (VII)] : face symmetrical [Cranial Nerves Vestibulocochlear (VIII)] : hearing was intact bilaterally [Cranial Nerves Glossopharyngeal (IX)] : tongue and palate midline [Cranial Nerves Accessory (XI - Cranial And Spinal)] : head turning and shoulder shrug symmetric [Cranial Nerves Hypoglossal (XII)] : there was no tongue deviation with protrusion [Motor Tone] : muscle tone was normal in all four extremities [Motor Strength] : muscle strength was normal in all four extremities [Paresis Pronator Drift Right-Sided] : no pronator drift on the right [Paresis Pronator Drift Left-Sided] : no pronator drift on the left [Sensation Tactile Decrease] : light touch was intact [Coordination - Dysmetria Impaired Finger-to-Nose Bilateral] : not present [2+] : Patella left 2+ [FreeTextEntry8] : tremulous in BUE, fine tremor, worse with position/action

## 2023-11-15 ENCOUNTER — NON-APPOINTMENT (OUTPATIENT)
Age: 73
End: 2023-11-15

## 2023-11-16 ENCOUNTER — EMERGENCY (EMERGENCY)
Facility: HOSPITAL | Age: 73
LOS: 0 days | Discharge: HOME | End: 2023-11-16
Attending: STUDENT IN AN ORGANIZED HEALTH CARE EDUCATION/TRAINING PROGRAM
Payer: MEDICARE

## 2023-11-16 VITALS
HEART RATE: 70 BPM | OXYGEN SATURATION: 100 % | TEMPERATURE: 98 F | WEIGHT: 164.91 LBS | RESPIRATION RATE: 18 BRPM | SYSTOLIC BLOOD PRESSURE: 129 MMHG | DIASTOLIC BLOOD PRESSURE: 11 MMHG

## 2023-11-16 VITALS
TEMPERATURE: 98 F | SYSTOLIC BLOOD PRESSURE: 118 MMHG | RESPIRATION RATE: 17 BRPM | HEART RATE: 68 BPM | DIASTOLIC BLOOD PRESSURE: 68 MMHG | OXYGEN SATURATION: 98 %

## 2023-11-16 DIAGNOSIS — I10 ESSENTIAL (PRIMARY) HYPERTENSION: ICD-10-CM

## 2023-11-16 DIAGNOSIS — M25.552 PAIN IN LEFT HIP: ICD-10-CM

## 2023-11-16 DIAGNOSIS — Z86.73 PERSONAL HISTORY OF TRANSIENT ISCHEMIC ATTACK (TIA), AND CEREBRAL INFARCTION WITHOUT RESIDUAL DEFICITS: ICD-10-CM

## 2023-11-16 DIAGNOSIS — M79.641 PAIN IN RIGHT HAND: ICD-10-CM

## 2023-11-16 DIAGNOSIS — M79.18 MYALGIA, OTHER SITE: ICD-10-CM

## 2023-11-16 DIAGNOSIS — M79.642 PAIN IN LEFT HAND: ICD-10-CM

## 2023-11-16 DIAGNOSIS — E78.5 HYPERLIPIDEMIA, UNSPECIFIED: ICD-10-CM

## 2023-11-16 DIAGNOSIS — Z91.041 RADIOGRAPHIC DYE ALLERGY STATUS: ICD-10-CM

## 2023-11-16 DIAGNOSIS — E03.9 HYPOTHYROIDISM, UNSPECIFIED: ICD-10-CM

## 2023-11-16 DIAGNOSIS — M25.551 PAIN IN RIGHT HIP: ICD-10-CM

## 2023-11-16 DIAGNOSIS — Z98.890 OTHER SPECIFIED POSTPROCEDURAL STATES: Chronic | ICD-10-CM

## 2023-11-16 LAB
ALBUMIN SERPL ELPH-MCNC: 4 G/DL — SIGNIFICANT CHANGE UP (ref 3.5–5.2)
ALBUMIN SERPL ELPH-MCNC: 4 G/DL — SIGNIFICANT CHANGE UP (ref 3.5–5.2)
ALP SERPL-CCNC: 129 U/L — HIGH (ref 30–115)
ALP SERPL-CCNC: 129 U/L — HIGH (ref 30–115)
ALT FLD-CCNC: 17 U/L — SIGNIFICANT CHANGE UP (ref 0–41)
ALT FLD-CCNC: 17 U/L — SIGNIFICANT CHANGE UP (ref 0–41)
ANION GAP SERPL CALC-SCNC: 11 MMOL/L — SIGNIFICANT CHANGE UP (ref 7–14)
ANION GAP SERPL CALC-SCNC: 11 MMOL/L — SIGNIFICANT CHANGE UP (ref 7–14)
AST SERPL-CCNC: 21 U/L — SIGNIFICANT CHANGE UP (ref 0–41)
AST SERPL-CCNC: 21 U/L — SIGNIFICANT CHANGE UP (ref 0–41)
BASOPHILS # BLD AUTO: 0.03 K/UL — SIGNIFICANT CHANGE UP (ref 0–0.2)
BASOPHILS # BLD AUTO: 0.03 K/UL — SIGNIFICANT CHANGE UP (ref 0–0.2)
BASOPHILS NFR BLD AUTO: 0.5 % — SIGNIFICANT CHANGE UP (ref 0–1)
BASOPHILS NFR BLD AUTO: 0.5 % — SIGNIFICANT CHANGE UP (ref 0–1)
BILIRUB SERPL-MCNC: 0.4 MG/DL — SIGNIFICANT CHANGE UP (ref 0.2–1.2)
BILIRUB SERPL-MCNC: 0.4 MG/DL — SIGNIFICANT CHANGE UP (ref 0.2–1.2)
BUN SERPL-MCNC: 13 MG/DL — SIGNIFICANT CHANGE UP (ref 10–20)
BUN SERPL-MCNC: 13 MG/DL — SIGNIFICANT CHANGE UP (ref 10–20)
CALCIUM SERPL-MCNC: 9.4 MG/DL — SIGNIFICANT CHANGE UP (ref 8.4–10.5)
CALCIUM SERPL-MCNC: 9.4 MG/DL — SIGNIFICANT CHANGE UP (ref 8.4–10.5)
CHLORIDE SERPL-SCNC: 104 MMOL/L — SIGNIFICANT CHANGE UP (ref 98–110)
CHLORIDE SERPL-SCNC: 104 MMOL/L — SIGNIFICANT CHANGE UP (ref 98–110)
CO2 SERPL-SCNC: 27 MMOL/L — SIGNIFICANT CHANGE UP (ref 17–32)
CO2 SERPL-SCNC: 27 MMOL/L — SIGNIFICANT CHANGE UP (ref 17–32)
CREAT SERPL-MCNC: 0.9 MG/DL — SIGNIFICANT CHANGE UP (ref 0.7–1.5)
CREAT SERPL-MCNC: 0.9 MG/DL — SIGNIFICANT CHANGE UP (ref 0.7–1.5)
EGFR: 91 ML/MIN/1.73M2 — SIGNIFICANT CHANGE UP
EGFR: 91 ML/MIN/1.73M2 — SIGNIFICANT CHANGE UP
EOSINOPHIL # BLD AUTO: 0.25 K/UL — SIGNIFICANT CHANGE UP (ref 0–0.7)
EOSINOPHIL # BLD AUTO: 0.25 K/UL — SIGNIFICANT CHANGE UP (ref 0–0.7)
EOSINOPHIL NFR BLD AUTO: 4 % — SIGNIFICANT CHANGE UP (ref 0–8)
EOSINOPHIL NFR BLD AUTO: 4 % — SIGNIFICANT CHANGE UP (ref 0–8)
GLUCOSE SERPL-MCNC: 88 MG/DL — SIGNIFICANT CHANGE UP (ref 70–99)
GLUCOSE SERPL-MCNC: 88 MG/DL — SIGNIFICANT CHANGE UP (ref 70–99)
HCT VFR BLD CALC: 42.7 % — SIGNIFICANT CHANGE UP (ref 42–52)
HCT VFR BLD CALC: 42.7 % — SIGNIFICANT CHANGE UP (ref 42–52)
HGB BLD-MCNC: 14.6 G/DL — SIGNIFICANT CHANGE UP (ref 14–18)
HGB BLD-MCNC: 14.6 G/DL — SIGNIFICANT CHANGE UP (ref 14–18)
IMM GRANULOCYTES NFR BLD AUTO: 0.5 % — HIGH (ref 0.1–0.3)
IMM GRANULOCYTES NFR BLD AUTO: 0.5 % — HIGH (ref 0.1–0.3)
LYMPHOCYTES # BLD AUTO: 0.81 K/UL — LOW (ref 1.2–3.4)
LYMPHOCYTES # BLD AUTO: 0.81 K/UL — LOW (ref 1.2–3.4)
LYMPHOCYTES # BLD AUTO: 12.9 % — LOW (ref 20.5–51.1)
LYMPHOCYTES # BLD AUTO: 12.9 % — LOW (ref 20.5–51.1)
MCHC RBC-ENTMCNC: 30.5 PG — SIGNIFICANT CHANGE UP (ref 27–31)
MCHC RBC-ENTMCNC: 30.5 PG — SIGNIFICANT CHANGE UP (ref 27–31)
MCHC RBC-ENTMCNC: 34.2 G/DL — SIGNIFICANT CHANGE UP (ref 32–37)
MCHC RBC-ENTMCNC: 34.2 G/DL — SIGNIFICANT CHANGE UP (ref 32–37)
MCV RBC AUTO: 89.1 FL — SIGNIFICANT CHANGE UP (ref 80–94)
MCV RBC AUTO: 89.1 FL — SIGNIFICANT CHANGE UP (ref 80–94)
MONOCYTES # BLD AUTO: 0.51 K/UL — SIGNIFICANT CHANGE UP (ref 0.1–0.6)
MONOCYTES # BLD AUTO: 0.51 K/UL — SIGNIFICANT CHANGE UP (ref 0.1–0.6)
MONOCYTES NFR BLD AUTO: 8.1 % — SIGNIFICANT CHANGE UP (ref 1.7–9.3)
MONOCYTES NFR BLD AUTO: 8.1 % — SIGNIFICANT CHANGE UP (ref 1.7–9.3)
NEUTROPHILS # BLD AUTO: 4.63 K/UL — SIGNIFICANT CHANGE UP (ref 1.4–6.5)
NEUTROPHILS # BLD AUTO: 4.63 K/UL — SIGNIFICANT CHANGE UP (ref 1.4–6.5)
NEUTROPHILS NFR BLD AUTO: 74 % — SIGNIFICANT CHANGE UP (ref 42.2–75.2)
NEUTROPHILS NFR BLD AUTO: 74 % — SIGNIFICANT CHANGE UP (ref 42.2–75.2)
NRBC # BLD: 0 /100 WBCS — SIGNIFICANT CHANGE UP (ref 0–0)
NRBC # BLD: 0 /100 WBCS — SIGNIFICANT CHANGE UP (ref 0–0)
PLATELET # BLD AUTO: 248 K/UL — SIGNIFICANT CHANGE UP (ref 130–400)
PLATELET # BLD AUTO: 248 K/UL — SIGNIFICANT CHANGE UP (ref 130–400)
PMV BLD: 8.9 FL — SIGNIFICANT CHANGE UP (ref 7.4–10.4)
PMV BLD: 8.9 FL — SIGNIFICANT CHANGE UP (ref 7.4–10.4)
POTASSIUM SERPL-MCNC: 4.1 MMOL/L — SIGNIFICANT CHANGE UP (ref 3.5–5)
POTASSIUM SERPL-MCNC: 4.1 MMOL/L — SIGNIFICANT CHANGE UP (ref 3.5–5)
POTASSIUM SERPL-SCNC: 4.1 MMOL/L — SIGNIFICANT CHANGE UP (ref 3.5–5)
POTASSIUM SERPL-SCNC: 4.1 MMOL/L — SIGNIFICANT CHANGE UP (ref 3.5–5)
PROT SERPL-MCNC: 6.7 G/DL — SIGNIFICANT CHANGE UP (ref 6–8)
PROT SERPL-MCNC: 6.7 G/DL — SIGNIFICANT CHANGE UP (ref 6–8)
RBC # BLD: 4.79 M/UL — SIGNIFICANT CHANGE UP (ref 4.7–6.1)
RBC # BLD: 4.79 M/UL — SIGNIFICANT CHANGE UP (ref 4.7–6.1)
RBC # FLD: 12.8 % — SIGNIFICANT CHANGE UP (ref 11.5–14.5)
RBC # FLD: 12.8 % — SIGNIFICANT CHANGE UP (ref 11.5–14.5)
SODIUM SERPL-SCNC: 142 MMOL/L — SIGNIFICANT CHANGE UP (ref 135–146)
SODIUM SERPL-SCNC: 142 MMOL/L — SIGNIFICANT CHANGE UP (ref 135–146)
WBC # BLD: 6.26 K/UL — SIGNIFICANT CHANGE UP (ref 4.8–10.8)
WBC # BLD: 6.26 K/UL — SIGNIFICANT CHANGE UP (ref 4.8–10.8)
WBC # FLD AUTO: 6.26 K/UL — SIGNIFICANT CHANGE UP (ref 4.8–10.8)
WBC # FLD AUTO: 6.26 K/UL — SIGNIFICANT CHANGE UP (ref 4.8–10.8)

## 2023-11-16 PROCEDURE — 80053 COMPREHEN METABOLIC PANEL: CPT

## 2023-11-16 PROCEDURE — 96374 THER/PROPH/DIAG INJ IV PUSH: CPT

## 2023-11-16 PROCEDURE — 99284 EMERGENCY DEPT VISIT MOD MDM: CPT | Mod: 25

## 2023-11-16 PROCEDURE — 99284 EMERGENCY DEPT VISIT MOD MDM: CPT | Mod: GC

## 2023-11-16 PROCEDURE — 36415 COLL VENOUS BLD VENIPUNCTURE: CPT

## 2023-11-16 PROCEDURE — 85025 COMPLETE CBC W/AUTO DIFF WBC: CPT

## 2023-11-16 RX ORDER — KETOROLAC TROMETHAMINE 30 MG/ML
15 SYRINGE (ML) INJECTION ONCE
Refills: 0 | Status: DISCONTINUED | OUTPATIENT
Start: 2023-11-16 | End: 2023-11-16

## 2023-11-16 RX ADMIN — Medication 15 MILLIGRAM(S): at 17:35

## 2023-11-16 NOTE — ED PROVIDER NOTE - PROGRESS NOTE DETAILS
pk: all results d/w pt & copies given, strict return precautions discussed. Extensive conversation had with pt and wife at bedside, wife has a psychiatrist that she speaks to and would like to obtain a referral for the pt to have someone to speak with. no concern for SI/HI. will also f.u with pcp. pk: NIHSS 0, will obtain cbc/cmp.

## 2023-11-16 NOTE — ED PROVIDER NOTE - NSFOLLOWUPINSTRUCTIONS_ED_ALL_ED_FT
Arthralgia    WHAT YOU NEED TO KNOW:    Arthralgia is pain in one or more joints, with no inflammation. It may be short-term and get better within 6 to 8 weeks. Arthralgia can be an early sign of arthritis. Arthralgia may be caused by a medical condition, such as a hormone disorder or a tumor. It may also be caused by an infection or injury.     DISCHARGE INSTRUCTIONS:    Medicines: The following medicines may be ordered for you:   •Acetaminophen decreases pain. Ask how much to take and how often to take it. Follow directions. Acetaminophen can cause liver damage if not taken correctly.      •NSAIDs decrease pain and prevent swelling. Ask your healthcare provider which medicine is right for you. Ask how much to take and when to take it. Take as directed. NSAIDs can cause stomach bleeding and kidney problems if not taken correctly.       •Pain relief cream decreases pain. Use this cream as directed.      •Take your medicine as directed. Contact your healthcare provider if you think your medicine is not helping or if you have side effects. Tell your provider if you are allergic to any medicine. Keep a list of the medicines, vitamins, and herbs you take. Include the amounts, and when and why you take them. Bring the list or the pill bottles to follow-up visits. Carry your medicine list with you in case of an emergency.      Follow up with your healthcare provider or specialist as directed: Write down your questions so you remember to ask them during your visits.     Self-care:   •Apply heat to help decrease pain. Use a heating pad or heat wrap. Apply heat for 20 to 30 minutes every 2 hours for as many days as directed.       •Rest as much as possible. Avoid activities that cause joint pain.       •Apply ice to help decrease swelling and pain. Ice may also help prevent tissue damage. Use an ice pack, or put crushed ice in a plastic bag. Cover it with a towel and place it on your painful joint for 15 to 20 minutes every hour or as directed.       •Support the joint with a brace or elastic wrap as directed.       •Elevate your joint above the level of your heart as often as you can to help decrease swelling and pain. Prop your painful joint on pillows or blankets to keep it elevated comfortably.       •Lose weight if you are overweight. Extra weight can put pressure on your joints and cause more pain. Ask your healthcare provider how much you should weigh. Ask him to help you create a weight loss plan.       •Exercise regularly to help improve joint movement and to decrease pain. Ask about the best exercise plan for you. Low-impact exercises can help take the pressure off your joints. Examples are walking, swimming, and water aerobics.       Physical therapy: A physical therapist teaches you exercises to help improve movement and strength, and to decrease pain. Ask your healthcare provider if physical therapy is right for you.    Contact your healthcare provider or specialist if:   •You have a fever.       •You continue to have joint pain that cannot be relieved with heat, ice, or medicine.      •You have pain and inflammation around your joint.      •You have questions or concerns about your condition or care.      Return to the emergency department if:   •You have sudden, severe pain when you move your joint.       •You have a fever and shaking chills.      •You cannot move your joint.      •You lose feeling on the side of your body where you have the painful joint.          © Copyright [employer] 2023

## 2023-11-16 NOTE — ED PROVIDER NOTE - CLINICAL SUMMARY MEDICAL DECISION MAKING FREE TEXT BOX
72 yo m with history of hypothyroidism, alcoholic presents to ED due to 1-2 weeks of generalized body pain, pain in the hands and legs over nights. denies chest pain, fever chills, trauma, denies morning stiffness, states now it is near the time the his grandson passed away with wife at bedside states feels his mood is down but no SI or HI. give ivf, labs unremarkable. wife has psychiatrist and pt has his own pmd discussed to follow with them and consider therapist psychiatris help in addition to follow with their pmd. agreed to disposition,.

## 2023-11-16 NOTE — ED PROVIDER NOTE - ATTENDING CONTRIBUTION TO CARE
I have personally performed a history and physical exam on this patient and personally directed the management of the patient.  74 yo m with history of hypothyroidism, alcoholic presents to ED due to 1-2 weeks of generalized body pain, pain in the hands and legs over nights. denies chest pain, fever chills, trauma, denies morning stiffness, states now it is near the time the his grandson passed away with wife at bedside states feels his mood is down but no SI or HI.  CON: appears stated age, pleasant, no acute distress, HENMT: normocephalic, atraumatic, anicteric, no conjunctival injection,  CV: regular rhythm, distal pulses intact, RESP: no acute respiratory distress, no stridor, breathing comfortably on RA , GI:  soft, nontender, no rebound, no guarding, SKIN: no wounds MSK: no deformities, NEURO: no gross motor or sensory deficit Psychiatric: appropriate mood, appropriate affect  will send last to evaluate electrolyte/ anemia and reevaluate

## 2023-11-16 NOTE — ED PROVIDER NOTE - PATIENT PORTAL LINK FT
You can access the FollowMyHealth Patient Portal offered by [facility] by registering at the following website: [email or web address]. By joining [employer]’s FollowMyHealth portal, you will also be able to view your health information using other applications (apps) compatible with our system.

## 2023-11-16 NOTE — ED PROVIDER NOTE - OBJECTIVE STATEMENT
Pt is a 73y male pmhx tia, hypothyroidism, htn, hld, prior etoh abuse presenting for evaluation of polyarthralgias and "feeling down." States he was seen at an urgent care today and referred to the ED to rule out a stroke. States he has been under a lot of stress for the last three years due to multiple sick family members. Denies any SI/HI/AVH. Otherwise denies any fever, chills, headache, changes in vision, cough, congestion, cp, palpitations, sob, n/v/d, abd pain, constipation, urinary complaints.

## 2023-12-15 ENCOUNTER — APPOINTMENT (OUTPATIENT)
Dept: NEUROLOGY | Facility: CLINIC | Age: 73
End: 2023-12-15

## 2024-01-08 ENCOUNTER — APPOINTMENT (OUTPATIENT)
Dept: NEUROLOGY | Facility: CLINIC | Age: 74
End: 2024-01-08

## 2024-03-11 ENCOUNTER — APPOINTMENT (OUTPATIENT)
Dept: NEUROLOGY | Facility: CLINIC | Age: 74
End: 2024-03-11

## 2024-05-31 NOTE — H&P ADULT - NSCORESITESY/N_GEN_A_CORE_RD
Christine Link is a 22 year old female.   Chief Complaint   Patient presents with    Follow - Up     Bilateral Nasal congestion reevaluation, reports no improvements        ASSESSMENT AND PLAN:   1. Adenoid hypertrophy  22-year-old who presents in follow-up regarding nasal obstruction.  She was seen about a month ago trialed on an oral steroid burst and taper, Flonase and Claritin-D.  She says that she has not had any relief.  She has already been on various allergy medications and antibiotics since December.  She states this is affecting her sleep    On nasal endoscopy she continues to have partially obstructing adenoid tissue as well as inferior turban hypertrophy septal deviation    Appears that she has a chronic adenoiditis with partial adenoid hypertrophy and turbinate hypertrophy.  These issues may be contributing to her nasal obstruction.  Will obtain a CT scan to better characterize her nasal airways and sinuses.  May eventually benefit from a septoplasty and adenoidectomy.  In the meantime we will trial on 2 weeks of Biaxin for possible adenoiditis given the mucopurulence over the adenoid pad.  She should also do saline irrigations.  Will see her back after the above workup to see her response and possibly planning surgery if still not improving    2. Nasal congestion      3. Hypertrophy of both inferior nasal turbinates      4. Nasal septal deviation      5. Nasal obstruction    - [facility] ENT (CPT=70486); Future      The patient indicates understanding of these issues and agrees to the plan.      EXAM:   There were no vitals taken for this visit.    Pertinent exam findings may also be noted above in assessment and plan     System Details   Skin Inspection - Normal.   Constitutional Overall appearance - Normal.   Head/Face Symmetric, TMJ tenderness not present    Eyes EOMI, PERRL   Right ear:  Canal clear, TM intact, no ASHLEY   Left ear:  Canal clear, TM intact, no ASHLEY   Nose: Septum midline,  inferior turbinates not enlarged, nasal valves without collapse    Oral cavity/Oropharynx: No lesions or masses on inspection or palpation, tonsils symmetric    Neck: Soft without LAD, thyroid not enlarged  Voice clear/ no stridor   Other:      Scopes and Procedures:     Nasal Endoscopy Procedure Note     Due to inability for adequate examination of the nose and nasopharynx and need for magnification to perform the examination, endoscopy was performed.  Risks and benefits were discussed with patient/family and they have given verbal consent to proceed.    Pre-operative Diagnosis:   1. Adenoid hypertrophy    2. Nasal congestion    3. Hypertrophy of both inferior nasal turbinates    4. Nasal septal deviation    5. Nasal obstruction        Post-operative Diagnosis: Same    Procedure: Diagnostic nasal endoscopy    Anesthesia: Topical anesthetic [place]     Surgeon Tim Mcelroy MD    EBL: 0cc    Procedure Detail & Findings:     After placement of topical anesthetic intranasally the endoscope was inserted into each nares and driven through the nasal cavity into the nasopharynx. The following findings were noted:    Septum: Midline  Inferior turbinates: Normal  Middle meatus: Patent  Middle turbinates: Normal  Purulence: None noted  Polyps: None noted  Nasopharynx and eustachian tube:  On nasal endoscopy she continues to have partially obstructing adenoid tissue as well as inferior turban hypertrophy septal deviation  Other: The middle and superior meatus, the turbinates, and the spheno-ethmoid recess were inspected and seen to be without significant abnormal findings.     Condition: Stable    Complications: Patient tolerated the procedure well with no immediate complication.    Tim Mcelroy MD        Current Outpatient Medications   Medication Sig Dispense Refill    clarithromycin 500 MG Oral Tab Take 1 tablet (500 mg total) by mouth 2 (two) times daily for 14 days. 28 tablet 0    Cholecalciferol (VITAMIN D3) 1.25 MG  (13950 UT) Oral Cap Take 1 capsule by mouth once a week.      cetirizine 10 MG Oral Tab Take 1 tablet (10 mg total) by mouth daily. (Patient not taking: Reported on 5/31/2024)      fluticasone propionate 50 MCG/ACT Nasal Suspension 2 sprays by Nasal route 2 (two) times daily. (Patient not taking: Reported on 5/31/2024)      Naproxen Sodium 550 MG Oral Tab 1 tablet with food every 12 hours as needed for pain with pain (Patient not taking: Reported on 5/31/2024)      fluticasone propionate 50 MCG/ACT Nasal Suspension 2 sprays by Nasal route in the morning and 2 sprays before bedtime. (Patient not taking: Reported on 5/31/2024) 16 g 3    montelukast 10 MG Oral Tab Take 1 tablet (10 mg total) by mouth nightly. (Patient not taking: Reported on 5/31/2024) 30 tablet 3    loratadine-pseudoephedrine ER  MG Oral Tablet 24 Hr Take 1 tablet by mouth at bedtime. (Patient not taking: Reported on 5/31/2024) 30 tablet 1      History reviewed. No pertinent past medical history.   Social History:  Social History     Socioeconomic History    Marital status: Unknown   Tobacco Use    Smoking status: Never    Smokeless tobacco: Never   Vaping Use    Vaping status: Never Used   Substance and Sexual Activity    Alcohol use: Never    Drug use: Never          Tim Mcelroy MD  5/31/2024  8:40 AM   No

## 2024-07-02 ENCOUNTER — APPOINTMENT (OUTPATIENT)
Dept: NEUROSURGERY | Facility: CLINIC | Age: 74
End: 2024-07-02

## 2024-07-02 VITALS — WEIGHT: 170 LBS | BODY MASS INDEX: 24.34 KG/M2 | HEIGHT: 70 IN

## 2024-07-02 DIAGNOSIS — Z87.19 PERSONAL HISTORY OF OTHER DISEASES OF THE DIGESTIVE SYSTEM: ICD-10-CM

## 2024-07-02 DIAGNOSIS — Z82.3 FAMILY HISTORY OF STROKE: ICD-10-CM

## 2024-07-02 DIAGNOSIS — M47.816 SPONDYLOSIS W/OUT MYELOPATHY OR RADICULOPATHY, LUMBAR REGION: ICD-10-CM

## 2024-07-02 DIAGNOSIS — Z82.49 FAMILY HISTORY OF ISCHEMIC HEART DISEASE AND OTHER DISEASES OF THE CIRCULATORY SYSTEM: ICD-10-CM

## 2024-07-02 DIAGNOSIS — Z87.39 PERSONAL HISTORY OF OTHER DISEASES OF THE MUSCULOSKELETAL SYSTEM AND CONNECTIVE TISSUE: ICD-10-CM

## 2024-07-02 DIAGNOSIS — Z86.59 PERSONAL HISTORY OF OTHER MENTAL AND BEHAVIORAL DISORDERS: ICD-10-CM

## 2024-07-02 DIAGNOSIS — M47.812 SPONDYLOSIS W/OUT MYELOPATHY OR RADICULOPATHY, CERVICAL REGION: ICD-10-CM

## 2024-07-02 DIAGNOSIS — M48.062 SPINAL STENOSIS, LUMBAR REGION WITH NEUROGENIC CLAUDICATION: ICD-10-CM

## 2024-07-02 DIAGNOSIS — Z86.73 PERSONAL HISTORY OF TRANSIENT ISCHEMIC ATTACK (TIA), AND CEREBRAL INFARCTION W/OUT RESIDUAL DEFICITS: ICD-10-CM

## 2024-07-02 DIAGNOSIS — Z85.46 PERSONAL HISTORY OF MALIGNANT NEOPLASM OF PROSTATE: ICD-10-CM

## 2024-07-02 DIAGNOSIS — Z80.9 FAMILY HISTORY OF MALIGNANT NEOPLASM, UNSPECIFIED: ICD-10-CM

## 2024-07-02 PROCEDURE — 99203 OFFICE O/P NEW LOW 30 MIN: CPT

## 2024-07-02 RX ORDER — TAMSULOSIN HYDROCHLORIDE 0.4 MG/1
CAPSULE ORAL
Refills: 0 | Status: ACTIVE | COMMUNITY

## 2024-07-02 RX ORDER — GABAPENTIN 300 MG
300 TABLET ORAL
Refills: 0 | Status: ACTIVE | COMMUNITY

## 2024-07-02 RX ORDER — GABAPENTIN 600 MG/1
600 TABLET, COATED ORAL 3 TIMES DAILY
Qty: 90 | Refills: 3 | Status: ACTIVE | COMMUNITY
Start: 2024-07-02 | End: 1900-01-01

## 2024-07-03 PROBLEM — M47.812 CERVICAL SPONDYLOSIS: Status: ACTIVE | Noted: 2024-07-03

## 2024-07-03 PROBLEM — M47.816 LUMBAR SPONDYLOSIS: Status: ACTIVE | Noted: 2024-07-03

## 2024-07-03 PROBLEM — M48.062 LUMBAR STENOSIS WITH NEUROGENIC CLAUDICATION: Status: ACTIVE | Noted: 2024-07-03

## 2024-12-18 ENCOUNTER — APPOINTMENT (OUTPATIENT)
Facility: CLINIC | Age: 74
End: 2024-12-18

## 2024-12-18 DIAGNOSIS — M47.816 SPONDYLOSIS W/OUT MYELOPATHY OR RADICULOPATHY, LUMBAR REGION: ICD-10-CM

## 2024-12-18 PROCEDURE — 99213 OFFICE O/P EST LOW 20 MIN: CPT

## 2024-12-18 PROCEDURE — 72100 X-RAY EXAM L-S SPINE 2/3 VWS: CPT

## 2025-01-05 ENCOUNTER — EMERGENCY (EMERGENCY)
Facility: HOSPITAL | Age: 75
LOS: 0 days | Discharge: HOME | End: 2025-01-05
Attending: STUDENT IN AN ORGANIZED HEALTH CARE EDUCATION/TRAINING PROGRAM
Payer: MEDICARE

## 2025-01-05 ENCOUNTER — NON-APPOINTMENT (OUTPATIENT)
Age: 75
End: 2025-01-05

## 2025-01-05 VITALS
TEMPERATURE: 98 F | DIASTOLIC BLOOD PRESSURE: 95 MMHG | SYSTOLIC BLOOD PRESSURE: 143 MMHG | HEART RATE: 93 BPM | RESPIRATION RATE: 18 BRPM | OXYGEN SATURATION: 98 %

## 2025-01-05 DIAGNOSIS — M54.89 OTHER DORSALGIA: ICD-10-CM

## 2025-01-05 DIAGNOSIS — E78.5 HYPERLIPIDEMIA, UNSPECIFIED: ICD-10-CM

## 2025-01-05 DIAGNOSIS — Z86.73 PERSONAL HISTORY OF TRANSIENT ISCHEMIC ATTACK (TIA), AND CEREBRAL INFARCTION WITHOUT RESIDUAL DEFICITS: ICD-10-CM

## 2025-01-05 DIAGNOSIS — M54.2 CERVICALGIA: ICD-10-CM

## 2025-01-05 DIAGNOSIS — Z91.041 RADIOGRAPHIC DYE ALLERGY STATUS: ICD-10-CM

## 2025-01-05 DIAGNOSIS — Z98.890 OTHER SPECIFIED POSTPROCEDURAL STATES: Chronic | ICD-10-CM

## 2025-01-05 DIAGNOSIS — E03.9 HYPOTHYROIDISM, UNSPECIFIED: ICD-10-CM

## 2025-01-05 DIAGNOSIS — I10 ESSENTIAL (PRIMARY) HYPERTENSION: ICD-10-CM

## 2025-01-05 PROCEDURE — 99284 EMERGENCY DEPT VISIT MOD MDM: CPT | Mod: FS

## 2025-01-05 PROCEDURE — 96372 THER/PROPH/DIAG INJ SC/IM: CPT

## 2025-01-05 PROCEDURE — 99283 EMERGENCY DEPT VISIT LOW MDM: CPT | Mod: 25

## 2025-01-05 RX ORDER — TIZANIDINE 4 MG/1
2 TABLET ORAL
Qty: 30 | Refills: 0
Start: 2025-01-05 | End: 2025-01-09

## 2025-01-05 RX ORDER — NAPROXEN 500 MG
1 TABLET, DELAYED RELEASE (ENTERIC COATED) ORAL
Qty: 10 | Refills: 0
Start: 2025-01-05 | End: 2025-01-09

## 2025-01-05 RX ORDER — LIDOCAINE 50 MG/G
1 OINTMENT TOPICAL
Qty: 2 | Refills: 0
Start: 2025-01-05 | End: 2025-01-14

## 2025-01-05 RX ORDER — KETOROLAC TROMETHAMINE 30 MG/ML
30 INJECTION INTRAMUSCULAR; INTRAVENOUS ONCE
Refills: 0 | Status: DISCONTINUED | OUTPATIENT
Start: 2025-01-05 | End: 2025-01-05

## 2025-01-05 RX ORDER — METHOCARBAMOL 500 MG
1500 TABLET ORAL ONCE
Refills: 0 | Status: COMPLETED | OUTPATIENT
Start: 2025-01-05 | End: 2025-01-05

## 2025-01-05 RX ORDER — DICLOFENAC SODIUM 1 %
1 GEL (GRAM) TOPICAL
Qty: 1 | Refills: 0
Start: 2025-01-05 | End: 2025-01-11

## 2025-01-05 RX ADMIN — Medication 1500 MILLIGRAM(S): at 14:30

## 2025-01-05 RX ADMIN — KETOROLAC TROMETHAMINE 30 MILLIGRAM(S): 30 INJECTION INTRAMUSCULAR; INTRAVENOUS at 14:30

## 2025-01-05 NOTE — ED PROVIDER NOTE - OBJECTIVE STATEMENT
74-year-old male past medical history of TIA, hypothyroid, hypertension, hyperlipidemia presents for evaluation of stiffness.  Patient endorses 1 week of neck stiffness that is worse in the morning, improved after stretching.  States symptoms started after doing some manual labor dialysis.  Denies fever, vision change, chest pain, shortness of breath, weakness, numbness, dysuria, hematuria, vomiting, diarrhea, rash.

## 2025-01-05 NOTE — ED PROVIDER NOTE - NSFOLLOWUPCLINICS_GEN_ALL_ED_FT
[facility]  Physical Therapy  Multiple Location  [place]   Phone: [phone]  Fax:

## 2025-01-05 NOTE — ED PROVIDER NOTE - CLINICAL SUMMARY MEDICAL DECISION MAKING FREE TEXT BOX
Throughout ED observation period, pt remained clinically and hemodynamically stable.  neuro exams nml  exam more c/w msk spasm and likely degenerative changes  no infectious complaints, no report of trauma  advised supportive care and spine/pain f/u

## 2025-01-05 NOTE — ED PROVIDER NOTE - ATTENDING APP SHARED VISIT CONTRIBUTION OF CARE
75 yo m hx hypothyroid, htn, hld, previous spine (discectomy) procedure s/ [place]i w/ plan for laminectomy  pt states when he wakes up he feels stiff to his neck. pt states he is needing to stretch for a while before getting out of bed. nothing taken for stiffness. no fever, cp, sob, vision changes, numbness, weakness, incontinence, low back pain.  sx have been going on for ~6 days and was preceded by bending the day before.     vss  gen- NAD, aaox3  card-rrr  lungs-ctab, no wheezing or rhonchi  abd-sntnd, no guarding or rebound  neuro- full str/sensation, cn ii-xii grossly intact, normal coordination and gait  Spine- no midline c/t/l spine ttp, neck supple, FROM to neck 75 yo m hx hypothyroid, htn, hld, previous lumbar spine procedure- no follows w/ neurosurg- [facility]  pt states when he wakes up he feels stiff to his neck. pt states he is needing to stretch for a while before getting out of bed. nothing taken for stiffness. no fever, cp, sob, vision changes, numbness, weakness, incontinence, low back pain.  sx have been going on for ~6 days and was preceded by bending the day before. pt not taking anything for pain     vss  gen- NAD, aaox3  card-rrr  lungs-ctab, no wheezing or rhonchi  abd-sntnd, no guarding or rebound  neuro- full str/sensation, cn ii-xii grossly intact, normal coordination and gait  Spine- no midline c/t/l spine ttp, neck supple, FROM to neck, R trap tenderness and muscle spasm, mild L trap tenderness, no rash

## 2025-01-05 NOTE — ED ADULT TRIAGE NOTE - TEMPERATURE IN FAHRENHEIT (DEGREES F)
08/[birth year]19    [address] [address]      Dear Devan Escalante,    Our records indicate that you have outstanding lab work and or testing that was ordered for you and has not yet been completed:  Orders Placed This Encounter        Mammo Scr 98.5

## 2025-01-05 NOTE — ED PROVIDER NOTE - NSFOLLOWUPINSTRUCTIONS_ED_ALL_ED_FT
Follow up with your spine doctor and pain doctor regarding neck pain for reassessment and outpatient imaging as needed  Apply lidoderm patches and/or diclofenac cream    Our Emergency Department Referral Coordinators will be reaching out to you in the next 24-48 hours from 9:00am to 5:00pm to schedule a follow up appointment. Please expect a phone call from the hospital in that time frame. If you do not receive a call or if you have any questions or concerns, you can reach them at   [phone] 325[facility].      Acute Neck Pain    WHAT YOU NEED TO KNOW:    What do I need to know about acute neck pain? Acute neck pain starts suddenly, increases quickly, and goes away in a few days. The pain may come and go, or be worse with certain movements. The pain may be only in your neck, or it may move to your arms, back, or shoulders. You may also have pain that starts in another body area and moves to your neck.  Vertebral Column    What causes or increases my risk for acute neck pain? Acute neck pain is often caused by a muscle strain or ligament sprain. Any of the following can increase your risk for acute neck pain:    Inflammation of discs in your neck    A condition that affects neck to arm nerves, such as thoracic outlet syndrome or brachial neuritis    A trauma or injury to your neck, such as being hit from behind in a car (whiplash) or sleeping in a bad position    Shingles, or an infection such as meningitis  How is the cause of acute neck pain diagnosed? Your healthcare provider will ask about your symptoms and when they began. Tell him or her if you were recently in an accident or had another injury to your neck. He or she will examine your neck and shoulders. He or she may also have you move your head in certain ways to see if any position causes or relieves the pain.    Blood tests may be used to measure the amount of inflammation or to check for signs of an infection.    X-ray or MRI pictures may show a neck injury or medical condition. Do not enter the MRI room with anything metal. Metal can cause serious damage. Tell the healthcare provider if you have any metal in or on your body.  How is acute neck pain treated? Treatment will depend on what is causing your pain.    Medicines may be prescribed or recommended for pain. You may need medicine to treat nerve pain or to stop muscle spasms. Medicines may also be given to reduce inflammation. Your healthcare provider may inject medicine into a nerve to block pain. Over-the-counter NSAID medicine or acetaminophen may be recommended to help treat minor pain or inflammation.    Traction is used to relieve pressure from nerves. Your head is gently pulled up and away from your neck. This stretches muscles and ligaments and makes more room for the spine. Your healthcare provider will tell you the kind of traction that will help your neck pain. Do not use traction devices at home unless directed by your healthcare provider.  What can I do to manage or prevent acute neck pain?    Rest your neck as directed. Do not make sudden movements, such as turning your head quickly. Your healthcare provider may recommend you wear a cervical collar for a short time. The collar will prevent you from moving your head. This will give your neck time to heal if an injury is causing your neck pain. Ask your healthcare provider when you can return to sports or other normal daily activities.  Cervical Collars      Apply heat as directed. Heat helps relieve pain and swelling. Use a heat wrap, or soak a small towel in warm water. Wring out the extra water. Apply the heat wrap or towel for 20 minutes every hour, or as directed.    Apply ice as directed. Ice helps relieve pain and swelling, and can help prevent tissue damage. Use an ice pack, or put ice in a bag. Cover the ice pack or back with a towel before you apply it to your neck. Apply the ice pack or ice for 15 minutes every hour, or as directed. Your healthcare provider can tell you how often to apply ice.    Do neck exercises as directed. Neck exercises help strengthen the muscles and increase range of motion. Your healthcare provider will tell you which exercises are right for you. He or she may give you instructions or recommend that you work with a physical therapist. Your healthcare provider or therapist can make sure you are doing the exercises correctly.    Maintain good posture. Try to keep your head and shoulders lifted when you sit. If you work in front of a computer, make sure the monitor is at the right level. You should not need to look up down to see the screen. You should also not have to lean forward to be able to read what is on the screen. Make sure your keyboard, mouse, and other computer items are placed where you do not have to extend your shoulder to reach them. Get up often if you work in front of a computer or sit for long periods of time. Stretch or walk around to keep your neck muscles loose.  Proper Ergonomics  When should I seek immediate care?    You have an injury that causes neck pain and shooting pain down your arms or legs.    Your neck pain suddenly becomes severe.    You have neck pain along with numbness, tingling, or weakness in your arms or legs.    You have a stiff neck, a headache, and a fever.  When should I call my doctor?    You have new or worsening symptoms.    Your symptoms continue even after treatment.    You have questions or concerns about your condition or care.  CARE AGREEMENT:    You have the right to help plan your care. Learn about your health condition and how it may be treated. Discuss treatment options with your healthcare providers to decide what care you want to receive. You always have the right to refuse treatment.

## 2025-01-05 NOTE — ED PROVIDER NOTE - PATIENT PORTAL LINK FT
You can access the FollowMyHealth Patient Portal offered by [facility] by registering at the following website: [email or web address]. By joining [facility]’s FollowMyHealth portal, you will also be able to view your health information using other applications (apps) compatible with our system.

## 2025-01-16 NOTE — CHART NOTE - NSCHARTNOTEFT_GEN_A_CORE
patient declined follow up
sent to [facility] 1/6 - DK / appt scheduled on 1/9 with Dr. Mora 1/7 - DK (PCP Referral)
complains of pain/discomfort

## 2025-01-17 ENCOUNTER — OUTPATIENT (OUTPATIENT)
Dept: OUTPATIENT SERVICES | Facility: HOSPITAL | Age: 75
LOS: 1 days | End: 2025-01-17
Payer: MEDICARE

## 2025-01-17 VITALS
RESPIRATION RATE: 16 BRPM | TEMPERATURE: 98 F | HEART RATE: 78 BPM | OXYGEN SATURATION: 93 % | HEIGHT: 70 IN | DIASTOLIC BLOOD PRESSURE: 82 MMHG | WEIGHT: 166.01 LBS | SYSTOLIC BLOOD PRESSURE: 128 MMHG

## 2025-01-17 DIAGNOSIS — M54.16 RADICULOPATHY, LUMBAR REGION: ICD-10-CM

## 2025-01-17 DIAGNOSIS — Z01.818 ENCOUNTER FOR OTHER PREPROCEDURAL EXAMINATION: ICD-10-CM

## 2025-01-17 DIAGNOSIS — Z98.890 OTHER SPECIFIED POSTPROCEDURAL STATES: Chronic | ICD-10-CM

## 2025-01-17 LAB
ALBUMIN SERPL ELPH-MCNC: 4.3 G/DL — SIGNIFICANT CHANGE UP (ref 3.5–5.2)
ALP SERPL-CCNC: 92 U/L — SIGNIFICANT CHANGE UP (ref 30–115)
ALT FLD-CCNC: 22 U/L — SIGNIFICANT CHANGE UP (ref 0–41)
ANION GAP SERPL CALC-SCNC: 8 MMOL/L — SIGNIFICANT CHANGE UP (ref 7–14)
APTT BLD: 35.5 SEC — SIGNIFICANT CHANGE UP (ref 27–39.2)
AST SERPL-CCNC: 27 U/L — SIGNIFICANT CHANGE UP (ref 0–41)
BASOPHILS # BLD AUTO: 0.05 K/UL — SIGNIFICANT CHANGE UP (ref 0–0.2)
BASOPHILS NFR BLD AUTO: 0.6 % — SIGNIFICANT CHANGE UP (ref 0–1)
BILIRUB SERPL-MCNC: 0.3 MG/DL — SIGNIFICANT CHANGE UP (ref 0.2–1.2)
BUN SERPL-MCNC: 15 MG/DL — SIGNIFICANT CHANGE UP (ref 10–20)
CALCIUM SERPL-MCNC: 10.4 MG/DL — SIGNIFICANT CHANGE UP (ref 8.4–10.5)
CHLORIDE SERPL-SCNC: 104 MMOL/L — SIGNIFICANT CHANGE UP (ref 98–110)
CO2 SERPL-SCNC: 31 MMOL/L — SIGNIFICANT CHANGE UP (ref 17–32)
CREAT SERPL-MCNC: 1 MG/DL — SIGNIFICANT CHANGE UP (ref 0.7–1.5)
EGFR: 80 ML/MIN/1.73M2 — SIGNIFICANT CHANGE UP
EOSINOPHIL # BLD AUTO: 0.34 K/UL — SIGNIFICANT CHANGE UP (ref 0–0.7)
EOSINOPHIL NFR BLD AUTO: 4.4 % — SIGNIFICANT CHANGE UP (ref 0–8)
GLUCOSE SERPL-MCNC: 96 MG/DL — SIGNIFICANT CHANGE UP (ref 70–99)
HCT VFR BLD CALC: 48.6 % — SIGNIFICANT CHANGE UP (ref 42–52)
HGB BLD-MCNC: 16.5 G/DL — SIGNIFICANT CHANGE UP (ref 14–18)
IMM GRANULOCYTES NFR BLD AUTO: 0.5 % — HIGH (ref 0.1–0.3)
INR BLD: 1 RATIO — SIGNIFICANT CHANGE UP (ref 0.65–1.3)
LYMPHOCYTES # BLD AUTO: 1.08 K/UL — LOW (ref 1.2–3.4)
LYMPHOCYTES # BLD AUTO: 13.8 % — LOW (ref 20.5–51.1)
MCHC RBC-ENTMCNC: 31.1 PG — HIGH (ref 27–31)
MCHC RBC-ENTMCNC: 34 G/DL — SIGNIFICANT CHANGE UP (ref 32–37)
MCV RBC AUTO: 91.7 FL — SIGNIFICANT CHANGE UP (ref 80–94)
MONOCYTES # BLD AUTO: 0.46 K/UL — SIGNIFICANT CHANGE UP (ref 0.1–0.6)
MONOCYTES NFR BLD AUTO: 5.9 % — SIGNIFICANT CHANGE UP (ref 1.7–9.3)
MRSA PCR RESULT.: NEGATIVE — SIGNIFICANT CHANGE UP
NEUTROPHILS # BLD AUTO: 5.84 K/UL — SIGNIFICANT CHANGE UP (ref 1.4–6.5)
NEUTROPHILS NFR BLD AUTO: 74.8 % — SIGNIFICANT CHANGE UP (ref 42.2–75.2)
NRBC # BLD: 0 /100 WBCS — SIGNIFICANT CHANGE UP (ref 0–0)
PLATELET # BLD AUTO: 300 K/UL — SIGNIFICANT CHANGE UP (ref 130–400)
PMV BLD: 9.5 FL — SIGNIFICANT CHANGE UP (ref 7.4–10.4)
POTASSIUM SERPL-MCNC: 4.8 MMOL/L — SIGNIFICANT CHANGE UP (ref 3.5–5)
POTASSIUM SERPL-SCNC: 4.8 MMOL/L — SIGNIFICANT CHANGE UP (ref 3.5–5)
PROT SERPL-MCNC: 7.6 G/DL — SIGNIFICANT CHANGE UP (ref 6–8)
PROTHROM AB SERPL-ACNC: 11.8 SEC — SIGNIFICANT CHANGE UP (ref 9.95–12.87)
RBC # BLD: 5.3 M/UL — SIGNIFICANT CHANGE UP (ref 4.7–6.1)
RBC # FLD: 12.7 % — SIGNIFICANT CHANGE UP (ref 11.5–14.5)
SODIUM SERPL-SCNC: 143 MMOL/L — SIGNIFICANT CHANGE UP (ref 135–146)
WBC # BLD: 7.81 K/UL — SIGNIFICANT CHANGE UP (ref 4.8–10.8)
WBC # FLD AUTO: 7.81 K/UL — SIGNIFICANT CHANGE UP (ref 4.8–10.8)

## 2025-01-17 PROCEDURE — 93010 ELECTROCARDIOGRAM REPORT: CPT

## 2025-01-17 PROCEDURE — 85730 THROMBOPLASTIN TIME PARTIAL: CPT

## 2025-01-17 PROCEDURE — 93005 ELECTROCARDIOGRAM TRACING: CPT

## 2025-01-17 PROCEDURE — 36415 COLL VENOUS BLD VENIPUNCTURE: CPT

## 2025-01-17 PROCEDURE — 87640 STAPH A DNA AMP PROBE: CPT

## 2025-01-17 PROCEDURE — 85025 COMPLETE CBC W/AUTO DIFF WBC: CPT

## 2025-01-17 PROCEDURE — 86850 RBC ANTIBODY SCREEN: CPT

## 2025-01-17 PROCEDURE — 86900 BLOOD TYPING SEROLOGIC ABO: CPT

## 2025-01-17 PROCEDURE — 99214 OFFICE O/P EST MOD 30 MIN: CPT | Mod: 25

## 2025-01-17 PROCEDURE — 87641 MR-STAPH DNA AMP PROBE: CPT

## 2025-01-17 PROCEDURE — 86901 BLOOD TYPING SEROLOGIC RH(D): CPT

## 2025-01-17 PROCEDURE — 80053 COMPREHEN METABOLIC PANEL: CPT

## 2025-01-17 PROCEDURE — 85610 PROTHROMBIN TIME: CPT

## 2025-01-17 NOTE — H&P PST ADULT - NSICDXPASTMEDICALHX_GEN_ALL_CORE_FT
PAST MEDICAL HISTORY:  Chronic back pain     Hyperlipidemia     Hypothyroidism     Prostate cancer     Stroke

## 2025-01-17 NOTE — H&P PST ADULT - HISTORY OF PRESENT ILLNESS
The patient reports pain in the left lower leg and reports this pain as radiating, pins and needles sensation, numbness down the left leg. The patient reports aches and soreness in the cervical region of spine and down to lumbar region of spine. Reports that pain radiates to the left hip. The patient states that the pain began approximately one year ago, and states that the pain progressively worsened with time.  Reports difficulties getting out of bed over the last week due to pain in the neck and back. Rates the pain a 10/10 on the pain scale, intermittent. The patient reports that activity worsens pain and pain medication as well rest relieves discomfort.   The patient reports recent TIA, approximately 6 months ago.     PATIENT CURRENTLY DENIES CHEST PAIN  SHORTNESS OF BREATH  PALPITATIONS,  DYSURIA, OR UPPER RESPIRATORY INFECTION IN PAST 2 WEEKS.     The patient confirms they have received, reviewed, and understood their preoperative spine education material.  In the event of any questions or concerns leading up to the surgery, the patient is aware of how to contact the surgeon's office or the [facility] SPINE [facility].     Opioid Risk Assessment Tool (Male)       Family history of substance abuse            Alcohol (3)            Illegal Drugs (3)            Prescription drugs (4)       Personal history of substance abuse            Alcohol (3)            Illegal Drugs (4)            Prescription drugs (5)       Age between 16-45 (1)       History of preadolescent sexual abuse (0)       Psychological disease (ADD, ADHD, OCD, Bipolar Disorder, Schizophrenia, Depression) (1)    Scoring Totals:  Low Risk (0-3)      Anesthesia Alert  YES--Difficult Airway CLASS III  NO--History of neck surgery or radiation  NO--Limited ROM of neck (reports some tightness, however has full ROM)  NO--History of Malignant hyperthermia  NO--Personal or family history of Pseudocholinesterase deficiency  NO--Prior Anesthesia Complication  NO--Latex Allergy  NO--Loose teeth  NO--History of Rheumatoid Arthritis  NO--ANGELIKA  NO-- BLEEDING RISK  NO--Other_____     RCRI: 1 point    DASI: 9.89 METS

## 2025-01-17 NOTE — H&P PST ADULT - REASON FOR ADMISSION
Patient is a 74 year old  male presenting to PAST in preparation for  BILATERAL LUMBAR 2- SACRAL 1 LAMINECOTMY on 02/07/2025 under general anesthesia by Dr. Rashad Guillory  .

## 2025-01-17 NOTE — H&P PST ADULT - MUSCULOSKELETAL
Essential tremors/ROM intact/normal gait/strength 5/5 bilateral upper extremities/strength 5/5 bilateral lower extremities

## 2025-01-18 DIAGNOSIS — Z01.818 ENCOUNTER FOR OTHER PREPROCEDURAL EXAMINATION: ICD-10-CM

## 2025-01-18 DIAGNOSIS — M54.16 RADICULOPATHY, LUMBAR REGION: ICD-10-CM

## 2025-01-27 PROBLEM — C61 MALIGNANT NEOPLASM OF PROSTATE: Chronic | Status: ACTIVE | Noted: 2025-01-17

## 2025-01-27 PROBLEM — I63.9 CEREBRAL INFARCTION, UNSPECIFIED: Chronic | Status: ACTIVE | Noted: 2025-01-17

## 2025-02-07 ENCOUNTER — INPATIENT (INPATIENT)
Facility: HOSPITAL | Age: 75
LOS: 5 days | Discharge: HOME HEALTH | DRG: 552 | End: 2025-02-13
Attending: NEUROLOGICAL SURGERY | Admitting: NEUROLOGICAL SURGERY
Payer: MEDICARE

## 2025-02-07 ENCOUNTER — APPOINTMENT (OUTPATIENT)
Dept: NEUROSURGERY | Facility: HOSPITAL | Age: 75
End: 2025-02-07

## 2025-02-07 VITALS
WEIGHT: 164.91 LBS | SYSTOLIC BLOOD PRESSURE: 165 MMHG | HEART RATE: 71 BPM | HEIGHT: 70 IN | DIASTOLIC BLOOD PRESSURE: 80 MMHG | TEMPERATURE: 98 F | OXYGEN SATURATION: 98 % | RESPIRATION RATE: 18 BRPM

## 2025-02-07 DIAGNOSIS — Z98.890 OTHER SPECIFIED POSTPROCEDURAL STATES: Chronic | ICD-10-CM

## 2025-02-07 DIAGNOSIS — M54.16 RADICULOPATHY, LUMBAR REGION: ICD-10-CM

## 2025-02-07 LAB
ALBUMIN SERPL ELPH-MCNC: 4.1 G/DL — SIGNIFICANT CHANGE UP (ref 3.5–5.2)
ALP SERPL-CCNC: 81 U/L — SIGNIFICANT CHANGE UP (ref 30–115)
ALT FLD-CCNC: 20 U/L — SIGNIFICANT CHANGE UP (ref 0–41)
ANION GAP SERPL CALC-SCNC: 9 MMOL/L — SIGNIFICANT CHANGE UP (ref 7–14)
AST SERPL-CCNC: 21 U/L — SIGNIFICANT CHANGE UP (ref 0–41)
BILIRUB SERPL-MCNC: 0.5 MG/DL — SIGNIFICANT CHANGE UP (ref 0.2–1.2)
BUN SERPL-MCNC: 10 MG/DL — SIGNIFICANT CHANGE UP (ref 10–20)
CALCIUM SERPL-MCNC: 9.1 MG/DL — SIGNIFICANT CHANGE UP (ref 8.4–10.5)
CHLORIDE SERPL-SCNC: 103 MMOL/L — SIGNIFICANT CHANGE UP (ref 98–110)
CO2 SERPL-SCNC: 25 MMOL/L — SIGNIFICANT CHANGE UP (ref 17–32)
CREAT SERPL-MCNC: 0.9 MG/DL — SIGNIFICANT CHANGE UP (ref 0.7–1.5)
EGFR: 91 ML/MIN/1.73M2 — SIGNIFICANT CHANGE UP
GLUCOSE SERPL-MCNC: 132 MG/DL — HIGH (ref 70–99)
HCT VFR BLD CALC: 43.5 % — SIGNIFICANT CHANGE UP (ref 42–52)
HGB BLD-MCNC: 14.8 G/DL — SIGNIFICANT CHANGE UP (ref 14–18)
MCHC RBC-ENTMCNC: 30.7 PG — SIGNIFICANT CHANGE UP (ref 27–31)
MCHC RBC-ENTMCNC: 34 G/DL — SIGNIFICANT CHANGE UP (ref 32–37)
MCV RBC AUTO: 90.2 FL — SIGNIFICANT CHANGE UP (ref 80–94)
NRBC # BLD: 0 /100 WBCS — SIGNIFICANT CHANGE UP (ref 0–0)
NRBC BLD-RTO: 0 /100 WBCS — SIGNIFICANT CHANGE UP (ref 0–0)
PLATELET # BLD AUTO: 320 K/UL — SIGNIFICANT CHANGE UP (ref 130–400)
PMV BLD: 9.8 FL — SIGNIFICANT CHANGE UP (ref 7.4–10.4)
POTASSIUM SERPL-MCNC: 4.2 MMOL/L — SIGNIFICANT CHANGE UP (ref 3.5–5)
POTASSIUM SERPL-SCNC: 4.2 MMOL/L — SIGNIFICANT CHANGE UP (ref 3.5–5)
PROT SERPL-MCNC: 6.3 G/DL — SIGNIFICANT CHANGE UP (ref 6–8)
RBC # BLD: 4.82 M/UL — SIGNIFICANT CHANGE UP (ref 4.7–6.1)
RBC # FLD: 12.5 % — SIGNIFICANT CHANGE UP (ref 11.5–14.5)
SODIUM SERPL-SCNC: 137 MMOL/L — SIGNIFICANT CHANGE UP (ref 135–146)
WBC # BLD: 14.85 K/UL — HIGH (ref 4.8–10.8)
WBC # FLD AUTO: 14.85 K/UL — HIGH (ref 4.8–10.8)

## 2025-02-07 PROCEDURE — 97166 OT EVAL MOD COMPLEX 45 MIN: CPT | Mod: GO

## 2025-02-07 PROCEDURE — 84439 ASSAY OF FREE THYROXINE: CPT

## 2025-02-07 PROCEDURE — 36415 COLL VENOUS BLD VENIPUNCTURE: CPT

## 2025-02-07 PROCEDURE — 84481 FREE ASSAY (FT-3): CPT

## 2025-02-07 PROCEDURE — 97161 PT EVAL LOW COMPLEX 20 MIN: CPT | Mod: GP

## 2025-02-07 PROCEDURE — 97116 GAIT TRAINING THERAPY: CPT | Mod: GP

## 2025-02-07 PROCEDURE — 82962 GLUCOSE BLOOD TEST: CPT

## 2025-02-07 PROCEDURE — 80053 COMPREHEN METABOLIC PANEL: CPT

## 2025-02-07 PROCEDURE — 85027 COMPLETE CBC AUTOMATED: CPT

## 2025-02-07 PROCEDURE — 82140 ASSAY OF AMMONIA: CPT

## 2025-02-07 PROCEDURE — 84443 ASSAY THYROID STIM HORMONE: CPT

## 2025-02-07 PROCEDURE — 97530 THERAPEUTIC ACTIVITIES: CPT | Mod: GP

## 2025-02-07 RX ORDER — OXYCODONE HYDROCHLORIDE 30 MG/1
10 TABLET ORAL EVERY 4 HOURS
Refills: 0 | Status: DISCONTINUED | OUTPATIENT
Start: 2025-02-07 | End: 2025-02-13

## 2025-02-07 RX ORDER — LEVOTHYROXINE SODIUM 175 UG/1
1 TABLET ORAL
Refills: 0 | DISCHARGE

## 2025-02-07 RX ORDER — GABAPENTIN 300 MG/1
0 CAPSULE ORAL
Refills: 0 | DISCHARGE

## 2025-02-07 RX ORDER — OXYCODONE HYDROCHLORIDE 30 MG/1
5 TABLET ORAL EVERY 4 HOURS
Refills: 0 | Status: DISCONTINUED | OUTPATIENT
Start: 2025-02-07 | End: 2025-02-13

## 2025-02-07 RX ORDER — PANTOPRAZOLE 20 MG/1
40 TABLET, DELAYED RELEASE ORAL
Refills: 0 | Status: DISCONTINUED | OUTPATIENT
Start: 2025-02-07 | End: 2025-02-13

## 2025-02-07 RX ORDER — BACTERIOSTATIC SODIUM CHLORIDE 0.9 %
1000 VIAL (ML) INJECTION
Refills: 0 | Status: DISCONTINUED | OUTPATIENT
Start: 2025-02-07 | End: 2025-02-09

## 2025-02-07 RX ORDER — ACETAMINOPHEN 160 MG/5ML
1000 SUSPENSION ORAL EVERY 8 HOURS
Refills: 0 | Status: DISCONTINUED | OUTPATIENT
Start: 2025-02-07 | End: 2025-02-13

## 2025-02-07 RX ORDER — GABAPENTIN 300 MG/1
1 CAPSULE ORAL
Refills: 0 | DISCHARGE

## 2025-02-07 RX ORDER — LEVOTHYROXINE SODIUM 25 UG/1
100 TABLET ORAL DAILY
Refills: 0 | Status: DISCONTINUED | OUTPATIENT
Start: 2025-02-07 | End: 2025-02-13

## 2025-02-07 RX ORDER — HYDROMORPHONE HYDROCHLORIDE 4 MG/ML
0.5 INJECTION, SOLUTION INTRAMUSCULAR; INTRAVENOUS; SUBCUTANEOUS
Refills: 0 | Status: DISCONTINUED | OUTPATIENT
Start: 2025-02-07 | End: 2025-02-07

## 2025-02-07 RX ORDER — HYDROMORPHONE HYDROCHLORIDE 4 MG/ML
1 INJECTION, SOLUTION INTRAMUSCULAR; INTRAVENOUS; SUBCUTANEOUS
Refills: 0 | Status: DISCONTINUED | OUTPATIENT
Start: 2025-02-07 | End: 2025-02-07

## 2025-02-07 RX ORDER — POLYETHYLENE GLYCOL 3350 17 G/17G
17 POWDER, FOR SOLUTION ORAL DAILY
Refills: 0 | Status: DISCONTINUED | OUTPATIENT
Start: 2025-02-07 | End: 2025-02-12

## 2025-02-07 RX ORDER — ONDANSETRON 4 MG/1
4 TABLET, ORALLY DISINTEGRATING ORAL EVERY 6 HOURS
Refills: 0 | Status: DISCONTINUED | OUTPATIENT
Start: 2025-02-07 | End: 2025-02-13

## 2025-02-07 RX ORDER — GABAPENTIN 800 MG/1
600 TABLET ORAL THREE TIMES A DAY
Refills: 0 | Status: DISCONTINUED | OUTPATIENT
Start: 2025-02-07 | End: 2025-02-13

## 2025-02-07 RX ORDER — CEFAZOLIN SODIUM IN 0.9 % NACL 2 G/10 ML
1000 SYRINGE (ML) INTRAVENOUS EVERY 8 HOURS
Refills: 0 | Status: DISCONTINUED | OUTPATIENT
Start: 2025-02-07 | End: 2025-02-12

## 2025-02-07 RX ORDER — APREPITANT 40 MG/1
40 CAPSULE ORAL ONCE
Refills: 0 | Status: COMPLETED | OUTPATIENT
Start: 2025-02-07 | End: 2025-02-07

## 2025-02-07 RX ORDER — METHOCARBAMOL 500 MG
750 TABLET ORAL EVERY 8 HOURS
Refills: 0 | Status: DISCONTINUED | OUTPATIENT
Start: 2025-02-07 | End: 2025-02-13

## 2025-02-07 RX ORDER — ACETAMINOPHEN 160 MG/5ML
1000 SUSPENSION ORAL ONCE
Refills: 0 | Status: COMPLETED | OUTPATIENT
Start: 2025-02-07 | End: 2025-02-07

## 2025-02-07 RX ORDER — SENNOSIDES 8.6 MG
2 TABLET ORAL AT BEDTIME
Refills: 0 | Status: DISCONTINUED | OUTPATIENT
Start: 2025-02-07 | End: 2025-02-13

## 2025-02-07 RX ORDER — CELECOXIB 200 MG
200 CAPSULE ORAL ONCE
Refills: 0 | Status: COMPLETED | OUTPATIENT
Start: 2025-02-07 | End: 2025-02-07

## 2025-02-07 RX ORDER — ONDANSETRON 4 MG/1
4 TABLET, ORALLY DISINTEGRATING ORAL ONCE
Refills: 0 | Status: DISCONTINUED | OUTPATIENT
Start: 2025-02-07 | End: 2025-02-07

## 2025-02-07 RX ADMIN — OXYCODONE HYDROCHLORIDE 5 MILLIGRAM(S): 30 TABLET ORAL at 19:03

## 2025-02-07 RX ADMIN — GABAPENTIN 600 MILLIGRAM(S): 800 TABLET ORAL at 21:35

## 2025-02-07 RX ADMIN — Medication 2 TABLET(S): at 21:35

## 2025-02-07 RX ADMIN — APREPITANT 40 MILLIGRAM(S): 40 CAPSULE ORAL at 11:52

## 2025-02-07 RX ADMIN — HYDROMORPHONE HYDROCHLORIDE 0.5 MILLIGRAM(S): 4 INJECTION, SOLUTION INTRAMUSCULAR; INTRAVENOUS; SUBCUTANEOUS at 19:19

## 2025-02-07 RX ADMIN — Medication 750 MILLIGRAM(S): at 21:35

## 2025-02-07 RX ADMIN — ACETAMINOPHEN 1000 MILLIGRAM(S): 160 SUSPENSION ORAL at 22:05

## 2025-02-07 RX ADMIN — ACETAMINOPHEN 1000 MILLIGRAM(S): 160 SUSPENSION ORAL at 11:53

## 2025-02-07 RX ADMIN — Medication 100 MILLIGRAM(S): at 21:34

## 2025-02-07 RX ADMIN — Medication 200 MILLIGRAM(S): at 11:52

## 2025-02-07 RX ADMIN — OXYCODONE HYDROCHLORIDE 5 MILLIGRAM(S): 30 TABLET ORAL at 19:33

## 2025-02-07 RX ADMIN — HYDROMORPHONE HYDROCHLORIDE 0.5 MILLIGRAM(S): 4 INJECTION, SOLUTION INTRAMUSCULAR; INTRAVENOUS; SUBCUTANEOUS at 19:49

## 2025-02-07 RX ADMIN — ACETAMINOPHEN 1000 MILLIGRAM(S): 160 SUSPENSION ORAL at 21:35

## 2025-02-07 NOTE — PRE-ANESTHESIA EVALUATION ADULT - WEIGHT IN KG
RN TELE NOTES,



WHEN LETTING PATIENT KNOW ABOUT THE PICTURES WE TOOK EVERY SUNDAY AS PROTOCOL, SHE REFUSED 
AND STATED THAT SHE DIDNT  WANT US TO TAKE PICTURES, EXPLAINED  RISKS AND BENEFITS AND THE 
INDICATION FOR IT, STILL REFUSED, WILL CONTINUE TO MONITOR CLOSELY. 74.8

## 2025-02-07 NOTE — BRIEF OPERATIVE NOTE - NSICDXBRIEFPOSTOP_GEN_ALL_CORE_FT
POST-OP DIAGNOSIS:  Neurogenic claudication due to lumbar spinal stenosis 07-Feb-2025 15:07:10  Rashad Guillory  
POST-OP DIAGNOSIS:  Neurogenic claudication due to lumbar spinal stenosis 07-Feb-2025 15:07:10  Rashad Guillory

## 2025-02-07 NOTE — BRIEF OPERATIVE NOTE - OPERATION/FINDINGS
Closure of spinal surgical wound with myocutaneous flap 12cm in length Closure of spinal surgical wound with myocutaneous flap. Initial wound 24cm x 5cm, Final wound 12cm in length

## 2025-02-07 NOTE — PROGRESS NOTE ADULT - SUBJECTIVE AND OBJECTIVE BOX
Neurosurgery Post Operative Note    POD#0  S/P L2-3, L3-4, L4-5, L5-S1 Bilateral laminectomy with plastics closure    Pt seen and examined at the bedside.  At present time pt is resting in bed comfortably VSS x1 black drain, wife at bedside.  Pt is now s/p L2--S1 bilateral laminectomy with plastics team closure, under GA, pt was extubated intraoperatively, EBL 50cc.  Pt states he has mild surgical site pain, pt is ELLER x4 with good strength, SILT, surgical site CDI.            Subjective: 74yMale with a pmhx of Lumbar radiculopathy    Handoff    Hyperlipidemia    Hypothyroidism    Stroke    Chronic back pain    Prostate cancer    Neurogenic claudication due to lumbar spinal stenosis    Neurogenic claudication due to lumbar spinal stenosis    Laminectomy, spine, lumbar, 4 levels, with discectomy    Myocutaneous flap of back    History of hip surgery    [id number]; [id number]    SysAdmin_VstLnk      s/p     Allergies    contrast media (iodine-based) (Unknown)    Intolerances        Vital Signs Last 24 Hrs  T(C): 36.9 (07 Feb 2025 17:30), Max: 36.9 (07 Feb 2025 17:30)  T(F): 98.4 (07 Feb 2025 17:30), Max: 98.4 (07 Feb 2025 17:30)  HR: 94 (07 Feb 2025 18:00) (71 - 94)  BP: 126/66 (07 Feb 2025 18:00) (120/75 - 165/80)  BP(mean): --  RR: 14 (07 Feb 2025 18:00) (12 - 22)  SpO2: 95% (07 Feb 2025 18:00) (93% - 100%)      acetaminophen     Tablet .. 1000 milliGRAM(s) Oral every 8 hours  ceFAZolin   IVPB 1000 milliGRAM(s) IV Intermittent every 8 hours  gabapentin 600 milliGRAM(s) Oral three times a day  HYDROmorphone  Injectable 0.5 milliGRAM(s) IV Push every 10 minutes PRN  HYDROmorphone  Injectable 1 milliGRAM(s) IV Push every 10 minutes PRN  levothyroxine 100 MICROGram(s) Oral daily  methocarbamol 750 milliGRAM(s) Oral every 8 hours  ondansetron Injectable 4 milliGRAM(s) IV Push every 6 hours PRN  ondansetron Injectable 4 milliGRAM(s) IV Push once PRN  oxyCODONE    IR 5 milliGRAM(s) Oral every 4 hours PRN  oxyCODONE    IR 10 milliGRAM(s) Oral every 4 hours PRN  pantoprazole    Tablet 40 milliGRAM(s) Oral before breakfast  polyethylene glycol 3350 17 Gram(s) Oral daily  senna 2 Tablet(s) Oral at bedtime  sodium chloride 0.9%. 1000 milliLiter(s) IV Continuous <Continuous>        02-07-25 @ 07:01  -  02-07-25 @ 18:33  --------------------------------------------------------  IN: 150 mL / OUT: 35 mL / NET: 115 mL        REVIEW OF SYSTEMS    [ ] A ten-point review of systems was otherwise negative except as noted.  [ ] Due to altered mental status/intubation, subjective information were not able to be obtained from the patient. History was obtained, to the extent possible, from review of the chart and collateral sources of information.      Exam:  AAOX3. Verbal function intact  Motor: MAEx4, 5/5 power in b/l UE and LE  b/l grasp intact  df pf 5/5  Sensation: SILT  wound: CDI  hany drain x1      CBC Full  -  ( 07 Feb 2025 16:34 )  WBC Count : 14.85 K/uL  RBC Count : 4.82 M/uL  Hemoglobin : 14.8 g/dL  Hematocrit : 43.5 %  Platelet Count - Automated : 320 K/uL  Mean Cell Volume : 90.2 fL  Mean Cell Hemoglobin : 30.7 pg  Mean Cell Hemoglobin Concentration : 34.0 g/dL  Auto Neutrophil # : x  Auto Lymphocyte # : x  Auto Monocyte # : x  Auto Eosinophil # : x  Auto Basophil # : x  Auto Neutrophil % : x  Auto Lymphocyte % : x  Auto Monocyte % : x  Auto Eosinophil % : x  Auto Basophil % : x    02-07    137  |  103  |  10  ----------------------------<  132[H]  4.2   |  25  |  0.9    Ca    9.1      07 Feb 2025 16:34    TPro  6.3  /  Alb  4.1  /  TBili  0.5  /  DBili  x   /  AST  21  /  ALT  20  /  AlkPhos  81  02-07            Wound:    Imaging:    Assessment/Plan:   74M, POD#0 S/P L2-3, L3-4, L4-5, L5-S1 Bilateral laminectomy with plastics closure    Pain control [id number] q8h, Robaxin 750mg q8hrs, oxycodone prn, Dilaudid prn  DASH diet, bowel regimen   Hany drain per plastics team  PT/OT eval, encourage IS  DVT ppx SCD  medical co management            Neurosurgery Post Operative Note    POD#0  S/P L2-3, L3-4, L4-5, L5-S1 Bilateral laminectomy with plastics closure    Pt seen and examined at the bedside.  At present time pt is resting in bed comfortably VSS x1 black drain, wife at bedside.  Pt is now s/p L2--S1 bilateral laminectomy with plastics team closure, under GA, pt was extubated intraoperatively, EBL 50cc.  Pt states he has mild surgical site pain, pt is ELLER x4 with good strength, SILT, surgical site CDI.            Subjective: 74yMale with a pmhx of Lumbar radiculopathy    Handoff    Hyperlipidemia    Hypothyroidism    Stroke    Chronic back pain    Prostate cancer    Neurogenic claudication due to lumbar spinal stenosis    Neurogenic claudication due to lumbar spinal stenosis    Laminectomy, spine, lumbar, 4 levels, with discectomy    Myocutaneous flap of back    History of hip surgery    [id number]; [id number]    SysAdmin_VstLnk      s/p     Allergies    contrast media (iodine-based) (Unknown)    Intolerances        Vital Signs Last 24 Hrs  T(C): 36.9 (07 Feb 2025 17:30), Max: 36.9 (07 Feb 2025 17:30)  T(F): 98.4 (07 Feb 2025 17:30), Max: 98.4 (07 Feb 2025 17:30)  HR: 94 (07 Feb 2025 18:00) (71 - 94)  BP: 126/66 (07 Feb 2025 18:00) (120/75 - 165/80)  BP(mean): --  RR: 14 (07 Feb 2025 18:00) (12 - 22)  SpO2: 95% (07 Feb 2025 18:00) (93% - 100%)      acetaminophen     Tablet .. 1000 milliGRAM(s) Oral every 8 hours  ceFAZolin   IVPB 1000 milliGRAM(s) IV Intermittent every 8 hours  gabapentin 600 milliGRAM(s) Oral three times a day  HYDROmorphone  Injectable 0.5 milliGRAM(s) IV Push every 10 minutes PRN  HYDROmorphone  Injectable 1 milliGRAM(s) IV Push every 10 minutes PRN  levothyroxine 100 MICROGram(s) Oral daily  methocarbamol 750 milliGRAM(s) Oral every 8 hours  ondansetron Injectable 4 milliGRAM(s) IV Push every 6 hours PRN  ondansetron Injectable 4 milliGRAM(s) IV Push once PRN  oxyCODONE    IR 5 milliGRAM(s) Oral every 4 hours PRN  oxyCODONE    IR 10 milliGRAM(s) Oral every 4 hours PRN  pantoprazole    Tablet 40 milliGRAM(s) Oral before breakfast  polyethylene glycol 3350 17 Gram(s) Oral daily  senna 2 Tablet(s) Oral at bedtime  sodium chloride 0.9%. 1000 milliLiter(s) IV Continuous <Continuous>        02-07-25 @ 07:01  -  02-07-25 @ 18:33  --------------------------------------------------------  IN: 150 mL / OUT: 35 mL / NET: 115 mL        REVIEW OF SYSTEMS    [ ] A ten-point review of systems was otherwise negative except as noted.  [ ] Due to altered mental status/intubation, subjective information were not able to be obtained from the patient. History was obtained, to the extent possible, from review of the chart and collateral sources of information.      Exam:  AAOX3. Verbal function intact  Motor: MAEx4, 5/5 power in b/l UE and LE  b/l grasp intact  df pf 5/5  Sensation: SILT  wound: CDI  hany drain x1      CBC Full  -  ( 07 Feb 2025 16:34 )  WBC Count : 14.85 K/uL  RBC Count : 4.82 M/uL  Hemoglobin : 14.8 g/dL  Hematocrit : 43.5 %  Platelet Count - Automated : 320 K/uL  Mean Cell Volume : 90.2 fL  Mean Cell Hemoglobin : 30.7 pg  Mean Cell Hemoglobin Concentration : 34.0 g/dL  Auto Neutrophil # : x  Auto Lymphocyte # : x  Auto Monocyte # : x  Auto Eosinophil # : x  Auto Basophil # : x  Auto Neutrophil % : x  Auto Lymphocyte % : x  Auto Monocyte % : x  Auto Eosinophil % : x  Auto Basophil % : x    02-07    137  |  103  |  10  ----------------------------<  132[H]  4.2   |  25  |  0.9    Ca    9.1      07 Feb 2025 16:34    TPro  6.3  /  Alb  4.1  /  TBili  0.5  /  DBili  x   /  AST  21  /  ALT  20  /  AlkPhos  81  02-07            Wound:    Imaging:    Assessment/Plan:   74M, POD#0 S/P L2-3, L3-4, L4-5, L5-S1 Bilateral laminectomy with plastics closure    Pain control [id number] q8h, Robaxin 750mg q8hrs, oxycodone prn, Dilaudid prn  DASH diet, bowel regimen   Hany drain per plastics team  PT/OT eval, encourage IS  DVT ppx SCD  marian op Abx  medical co management

## 2025-02-07 NOTE — PATIENT PROFILE ADULT - FALL HARM RISK - HARM RISK INTERVENTIONS

## 2025-02-07 NOTE — BRIEF OPERATIVE NOTE - NSICDXBRIEFPROCEDURE_GEN_ALL_CORE_FT
PROCEDURES:  Myocutaneous flap of back 07-Feb-2025 16:05:29  Thee Emerson  
PROCEDURES:  Laminectomy, spine, lumbar, 4 levels, with discectomy 07-Feb-2025 15:06:59  Rashad Guillory

## 2025-02-07 NOTE — BRIEF OPERATIVE NOTE - NSICDXBRIEFPREOP_GEN_ALL_CORE_FT
PRE-OP DIAGNOSIS:  Neurogenic claudication due to lumbar spinal stenosis 07-Feb-2025 15:07:07  Rashad Guillory  
PRE-OP DIAGNOSIS:  Neurogenic claudication due to lumbar spinal stenosis 07-Feb-2025 15:07:07  Rashad Guillory

## 2025-02-08 LAB
ALBUMIN SERPL ELPH-MCNC: 3.8 G/DL — SIGNIFICANT CHANGE UP (ref 3.5–5.2)
ALP SERPL-CCNC: 70 U/L — SIGNIFICANT CHANGE UP (ref 30–115)
ALT FLD-CCNC: 16 U/L — SIGNIFICANT CHANGE UP (ref 0–41)
ANION GAP SERPL CALC-SCNC: 10 MMOL/L — SIGNIFICANT CHANGE UP (ref 7–14)
AST SERPL-CCNC: 19 U/L — SIGNIFICANT CHANGE UP (ref 0–41)
BILIRUB SERPL-MCNC: 0.8 MG/DL — SIGNIFICANT CHANGE UP (ref 0.2–1.2)
BUN SERPL-MCNC: 10 MG/DL — SIGNIFICANT CHANGE UP (ref 10–20)
CALCIUM SERPL-MCNC: 9 MG/DL — SIGNIFICANT CHANGE UP (ref 8.4–10.5)
CHLORIDE SERPL-SCNC: 106 MMOL/L — SIGNIFICANT CHANGE UP (ref 98–110)
CO2 SERPL-SCNC: 23 MMOL/L — SIGNIFICANT CHANGE UP (ref 17–32)
CREAT SERPL-MCNC: 0.8 MG/DL — SIGNIFICANT CHANGE UP (ref 0.7–1.5)
EGFR: 94 ML/MIN/1.73M2 — SIGNIFICANT CHANGE UP
GLUCOSE SERPL-MCNC: 126 MG/DL — HIGH (ref 70–99)
HCT VFR BLD CALC: 40.6 % — LOW (ref 42–52)
HGB BLD-MCNC: 13.7 G/DL — LOW (ref 14–18)
MCHC RBC-ENTMCNC: 30.4 PG — SIGNIFICANT CHANGE UP (ref 27–31)
MCHC RBC-ENTMCNC: 33.7 G/DL — SIGNIFICANT CHANGE UP (ref 32–37)
MCV RBC AUTO: 90.2 FL — SIGNIFICANT CHANGE UP (ref 80–94)
NRBC # BLD: 0 /100 WBCS — SIGNIFICANT CHANGE UP (ref 0–0)
NRBC BLD-RTO: 0 /100 WBCS — SIGNIFICANT CHANGE UP (ref 0–0)
PLATELET # BLD AUTO: 309 K/UL — SIGNIFICANT CHANGE UP (ref 130–400)
PMV BLD: 9.4 FL — SIGNIFICANT CHANGE UP (ref 7.4–10.4)
POTASSIUM SERPL-MCNC: 4.6 MMOL/L — SIGNIFICANT CHANGE UP (ref 3.5–5)
POTASSIUM SERPL-SCNC: 4.6 MMOL/L — SIGNIFICANT CHANGE UP (ref 3.5–5)
PROT SERPL-MCNC: 6.1 G/DL — SIGNIFICANT CHANGE UP (ref 6–8)
RBC # BLD: 4.5 M/UL — LOW (ref 4.7–6.1)
RBC # FLD: 12.6 % — SIGNIFICANT CHANGE UP (ref 11.5–14.5)
SODIUM SERPL-SCNC: 139 MMOL/L — SIGNIFICANT CHANGE UP (ref 135–146)
WBC # BLD: 14.53 K/UL — HIGH (ref 4.8–10.8)
WBC # FLD AUTO: 14.53 K/UL — HIGH (ref 4.8–10.8)

## 2025-02-08 PROCEDURE — 99232 SBSQ HOSP IP/OBS MODERATE 35: CPT

## 2025-02-08 RX ORDER — ENOXAPARIN SODIUM 100 MG/ML
40 INJECTION SUBCUTANEOUS EVERY 24 HOURS
Refills: 0 | Status: DISCONTINUED | OUTPATIENT
Start: 2025-02-08 | End: 2025-02-13

## 2025-02-08 RX ADMIN — Medication 750 MILLIGRAM(S): at 05:44

## 2025-02-08 RX ADMIN — Medication 2 TABLET(S): at 21:29

## 2025-02-08 RX ADMIN — ACETAMINOPHEN 1000 MILLIGRAM(S): 160 SUSPENSION ORAL at 13:08

## 2025-02-08 RX ADMIN — Medication 100 MILLIGRAM(S): at 05:44

## 2025-02-08 RX ADMIN — GABAPENTIN 600 MILLIGRAM(S): 800 TABLET ORAL at 13:08

## 2025-02-08 RX ADMIN — GABAPENTIN 600 MILLIGRAM(S): 800 TABLET ORAL at 05:44

## 2025-02-08 RX ADMIN — Medication 750 MILLIGRAM(S): at 21:29

## 2025-02-08 RX ADMIN — Medication 750 MILLIGRAM(S): at 13:08

## 2025-02-08 RX ADMIN — Medication 100 MILLIGRAM(S): at 13:07

## 2025-02-08 RX ADMIN — ACETAMINOPHEN 1000 MILLIGRAM(S): 160 SUSPENSION ORAL at 13:38

## 2025-02-08 RX ADMIN — ACETAMINOPHEN 1000 MILLIGRAM(S): 160 SUSPENSION ORAL at 05:44

## 2025-02-08 RX ADMIN — PANTOPRAZOLE 40 MILLIGRAM(S): 20 TABLET, DELAYED RELEASE ORAL at 05:44

## 2025-02-08 RX ADMIN — ENOXAPARIN SODIUM 40 MILLIGRAM(S): 100 INJECTION SUBCUTANEOUS at 11:43

## 2025-02-08 RX ADMIN — GABAPENTIN 600 MILLIGRAM(S): 800 TABLET ORAL at 21:29

## 2025-02-08 RX ADMIN — LEVOTHYROXINE SODIUM 100 MICROGRAM(S): 25 TABLET ORAL at 05:44

## 2025-02-08 RX ADMIN — POLYETHYLENE GLYCOL 3350 17 GRAM(S): 17 POWDER, FOR SOLUTION ORAL at 11:43

## 2025-02-08 RX ADMIN — ACETAMINOPHEN 1000 MILLIGRAM(S): 160 SUSPENSION ORAL at 21:31

## 2025-02-08 RX ADMIN — Medication 100 MILLIGRAM(S): at 21:29

## 2025-02-08 NOTE — PROGRESS NOTE ADULT - SUBJECTIVE AND OBJECTIVE BOX
GENERAL SURGERY PROGRESS NOTE     JORDAN [address] day :2d    POD:1d  Procedure: Laminectomy, spine, lumbar, 4 levels, with discectomy    Myocutaneous flap of back      Surgical Attending: Jd Olmos  Overnight events: No acute events overnight. Pt has Mepilex dressing in place C/D/I. TAL drain in place draining sanguinous output. Pt has motor and sensory intact in b/l LE. Denies any numbness or tingling in b/l LE.      T(F): 98.4 (02-08-25 @ 00:16), Max: 98.8 (02-07-25 @ 20:42)  HR: 96 (02-08-25 @ 00:16) (71 - 107)  BP: 107/67 (02-08-25 @ 00:16) (107/67 - 165/80)  ABP: --  ABP(mean): --  RR: 18 (02-08-25 @ 00:16) (12 - 22)  SpO2: 98% (02-08-25 @ 00:16) (93% - 100%)    IN'S / OUT's:    02-07-25 @ 07:01  -  02-08-25 @ 01:37  --------------------------------------------------------  IN:    sodium chloride 0.9%: 225 mL  Total IN: 225 mL    OUT:    Bulb (mL): 90 mL    Voided (mL): 200 mL  Total OUT: 290 mL    Total NET: -65 mL          PHYSICAL EXAM:  GENERAL: NAD  CHEST/LUNG: Equal chest rise b/l, non labored breathing   HEART: Regular rate and rhythm  ABDOMEN: Soft, Nontender, Nondistended;   EXTREMITIES:  No clubbing, cyanosis, or edema  BACK: Mepilex dressing in place C/D/I, TAL drain with sanguinous output      LABS  Labs:  CAPILLARY BLOOD GLUCOSE                              14.8   14.85 )-----------( 320      ( 07 Feb 2025 16:34 )             43.5         02-07    137  |  103  |  10  ----------------------------<  132[H]  4.2   |  25  |  0.9      Calcium: 9.1 mg/dL (02-07-25 @ 16:34)      LFTs:             6.3  | 0.5  | 21       ------------------[81      ( 07 Feb 2025 16:34 )  4.1  | x    | 20          Lipase:x      Amylase:x             Coags:            Urinalysis Basic - ( 07 Feb 2025 16:34 )    Color: x / Appearance: x / SG: x / pH: x  Gluc: 132 mg/dL / Ketone: x  / Bili: x / Urobili: x   Blood: x / Protein: x / Nitrite: x   Leuk Esterase: x / RBC: x / WBC x   Sq Epi: x / Non Sq Epi: x / Bacteria: x            RADIOLOGY & ADDITIONAL TESTS:      A/P:  DARON ORTEGA is a 74M, POD#0 S/P L2-3, L3-4, L4-5, L5-S1 Bilateral laminectomy with closure of spinal surgical wound w/ myocutaneous flap on 2/7        PLAN:   - monitor TAL drain output q12 hrs and q24 hours  - continue Abx  - multi modal pain control  - daily labs, replete electrolytes as needed   - Rest of care per primary team      #Antibiotics: ceFAZolin   IVPB 1000 milliGRAM(s) IV Intermittent every 8 hours, 02-07-25 @ 16:08   Day **  #GI ppx: pantoprazole    Tablet 40 milliGRAM(s) Oral before breakfast    Disposition:  4C    Above plan to be discussed with Attending Surgeon Dr. Olmos , patient, patient family, and rest of health care team    [employer] [id number]

## 2025-02-08 NOTE — PROGRESS NOTE ADULT - SUBJECTIVE AND OBJECTIVE BOX
SUBJECTIVE:   DARON ORTEGA (74y Male) was seen at bedside. Not in acute distress. Minimal pain on movement.     REVIEW OF SYSTEM:   RESPIRATORY: No cough, wheezing, hemoptysis, or shortness of breath  CARDIOVASCULAR: No chest pain, palpitations, or leg swelling  GASTROINTESTINAL: No abdominal pain, abdominal distension, nausea, vomiting, hematemesis, diarrhea, constipation, melena, or hematochezia.  GENITOURINARY: No dysuria, or flank tenderness   NEUROLOGICAL: No headaches, loss of strength, loss of sensation, slurred speech, or tremors  MUSCULOSKELETAL: Back pain    PHYSICAL EXAM:  HEENT: clear conjunctiva pupils bilaterally equal and reactive to light  NERVOUS SYSTEM:  Alert & Oriented to name/place/person. Moves all extremities. No sensory deficits. No tremors. No CN deficits.   LUNG: Bilateral clear air entry. No wheezing or crackles.   HEART: S1, S2 audible. No abnormal cardiac murmur.   ABDOMEN: Soft, nontender, nondistended, BS+  SKIN: No rashes, or lesions  TAL drain in place with serosanguinous discharge.     Vital Signs Last 24 Hrs  T(C): 36.7 (08 Feb 2025 11:37), Max: 37.1 (07 Feb 2025 20:42)  T(F): 98 (08 Feb 2025 11:37), Max: 98.8 (07 Feb 2025 20:42)  HR: 82 (08 Feb 2025 11:37) (68 - 107)  BP: 114/62 (08 Feb 2025 11:37) (107/67 - 134/79)  BP(mean): --  RR: 18 (08 Feb 2025 11:37) (12 - 22)  SpO2: 98% (08 Feb 2025 11:37) (93% - 100%)    Parameters below as of 08 Feb 2025 11:37  Patient On (Oxygen Delivery Method): room air    MEDICATIONS  (STANDING):  acetaminophen     Tablet .. 1000 milliGRAM(s) Oral every 8 hours  ceFAZolin   IVPB 1000 milliGRAM(s) IV Intermittent every 8 hours  enoxaparin Injectable 40 milliGRAM(s) SubCutaneous every 24 hours  gabapentin 600 milliGRAM(s) Oral three times a day  influenza  Vaccine (HIGH DOSE) 0.5 milliLiter(s) IntraMuscular once  levothyroxine 100 MICROGram(s) Oral daily  methocarbamol 750 milliGRAM(s) Oral every 8 hours  pantoprazole    Tablet 40 milliGRAM(s) Oral before breakfast  polyethylene glycol 3350 17 Gram(s) Oral daily  senna 2 Tablet(s) Oral at bedtime  sodium chloride 0.9%. 1000 milliLiter(s) (75 mL/Hr) IV Continuous <Continuous>    MEDICATIONS  (PRN):  ondansetron Injectable 4 milliGRAM(s) IV Push every 6 hours PRN Nausea and/or Vomiting  oxyCODONE    IR 5 milliGRAM(s) Oral every 4 hours PRN Moderate Pain (4 - 6)  oxyCODONE    IR 10 milliGRAM(s) Oral every 4 hours PRN Severe Pain (7 - 10)

## 2025-02-08 NOTE — PROGRESS NOTE ADULT - SUBJECTIVE AND OBJECTIVE BOX
POD#1    S/P L2-3, L3-4, L4-5, L5-S1 Bilateral laminectomy with plastics closure    Pt seen and examined at bedside. Pt c/o incisional pain    Vital Signs Last 24 Hrs  T(C): 36.7 (08 Feb 2025 11:37), Max: 37.1 (07 Feb 2025 20:42)  T(F): 98 (08 Feb 2025 11:37), Max: 98.8 (07 Feb 2025 20:42)  HR: 82 (08 Feb 2025 11:37) (68 - 107)  BP: 114/62 (08 Feb 2025 11:37) (107/67 - 134/79)  BP(mean): --  RR: 18 (08 Feb 2025 11:37) (12 - 22)  SpO2: 98% (08 Feb 2025 11:37) (93% - 100%)    Parameters below as of 08 Feb 2025 11:37  Patient On (Oxygen Delivery Method): room air        I&O's Detail    07 Feb 2025 07:01  -  08 Feb 2025 07:00  --------------------------------------------------------  IN:    sodium chloride 0.9%: 225 mL  Total IN: 225 mL    OUT:    Bulb (mL): 140 mL    Voided (mL): 500 mL  Total OUT: 640 mL    Total NET: -415 mL      08 Feb 2025 07:01  -  08 Feb 2025 14:23  --------------------------------------------------------  IN:  Total IN: 0 mL    OUT:    Bulb (mL): 40 mL    Voided (mL): 900 mL  Total OUT: 940 mL    Total NET: -940 mL        I&O's Summary    07 Feb 2025 07:01  -  08 Feb 2025 07:00  --------------------------------------------------------  IN: 225 mL / OUT: 640 mL / NET: -415 mL    08 Feb 2025 07:01  -  08 Feb 2025 14:23  --------------------------------------------------------  IN: 0 mL / OUT: 940 mL / NET: -940 mL        REVIEW OF SYSTEMS    [X] A ten-point review of systems was otherwise negative except as noted.  [ ] Due to altered mental status/intubation, subjective information were not able to be obtained from the patient. History was obtained, to the extent possible, from review of the chart and collateral sources of information.      PHYSICAL EXAM:  Neurological:  Strength 5/5 B/L LE  + Sensation to light touch  Dressing intact  Drain in place      LABS:                        13.7   14.53 )-----------( 309      ( 08 Feb 2025 05:39 )             40.6     02-08    139  |  106  |  10  ----------------------------<  126[H]  4.6   |  23  |  0.8    Ca    9.0      08 Feb 2025 05:39    TPro  6.1  /  Alb  3.8  /  TBili  0.8  /  DBili  x   /  AST  19  /  ALT  16  /  AlkPhos  70  02-08      Urinalysis Basic - ( 08 Feb 2025 05:39 )    Color: x / Appearance: x / SG: x / pH: x  Gluc: 126 mg/dL / Ketone: x  / Bili: x / Urobili: x   Blood: x / Protein: x / Nitrite: x   Leuk Esterase: x / RBC: x / WBC x   Sq Epi: x / Non Sq Epi: x / Bacteria: x      Allergies    contrast media (iodine-based) (Unknown)    Intolerances      MEDICATIONS:  Antibiotics:  ceFAZolin   IVPB 1000 milliGRAM(s) IV Intermittent every 8 hours    Neuro:  acetaminophen     Tablet .. 1000 milliGRAM(s) Oral every 8 hours  gabapentin 600 milliGRAM(s) Oral three times a day  methocarbamol 750 milliGRAM(s) Oral every 8 hours  ondansetron Injectable 4 milliGRAM(s) IV Push every 6 hours PRN  oxyCODONE    IR 5 milliGRAM(s) Oral every 4 hours PRN  oxyCODONE    IR 10 milliGRAM(s) Oral every 4 hours PRN      IVF:  sodium chloride 0.9%. 1000 milliLiter(s) IV Continuous <Continuous>    ASSESSMENT:  74y Male s/p    Lumbar radiculopathy    Handoff    MEWS Score    Hyperlipidemia    Hypothyroidism    Stroke    Chronic back pain    Prostate cancer    Neurogenic claudication due to lumbar spinal stenosis    Neurogenic claudication due to lumbar spinal stenosis    Laminectomy, spine, lumbar, 4 levels, with discectomy    Myocutaneous flap of back    History of hip surgery    [id number]; [id number]    SysAdmin_VstLnk        PLAN:  Lovenox  Monitor Drain o/p  PT/OT  Pain Meds PRN

## 2025-02-08 NOTE — PROGRESS NOTE ADULT - ASSESSMENT
DARON ORTEGA (74y Male) with PMH of hypothyroidism, TIA, and lumbar spinal stenosis underwent L2-3, L3-4, L4-5, L5-S1 Bilateral laminectomy with closure of spinal surgical wound w/ myocutaneous flap on 2/7/25. He is admitted under neurosurgical service for post-op care.    Hospital medicine is consulted for co-management of medical co-morbidities.       # Lumbar spinal stenosis   # L2-3, L3-4, L4-5, L5-S1 Bilateral laminectomy with closure of spinal surgical wound w/ myocutaneous flap on 2/7/25  - TAL drain in place with serosanguinous discharge   - Management as per primary team       # Leucocytosis   - Likely reactive in setting of operative procedure.   - WBC 14.85  - Remains afebrile  - Carolin-op Ancef as per NSGY      # Hypothyroidism   - C/w levothyroxine       # Prophylactic measures -- as per primary team   - DVT PPx: Lovenox   - GI PPx: Pantoprazole    - Diet: DASH/TLC   - Activity: Advance as tolerated     Physician Handoff  - WBC. Vitals.    Spent over 35 min reviewing chart, speaking with patient/family and on coordinating patient care during interdisciplinary rounds.            DARON ORTEGA (74y Male) with PMH of hypothyroidism, TIA, and lumbar spinal stenosis underwent L2-3, L3-4, L4-5, L5-S1 Bilateral laminectomy with closure of spinal surgical wound w/ myocutaneous flap on 2/7/25. He is admitted under neurosurgical service for post-op care.    Hospital medicine is consulted for co-management of medical co-morbidities.       # Lumbar spinal stenosis   # L2-3, L3-4, L4-5, L5-S1 Bilateral laminectomy with closure of spinal surgical wound w/ myocutaneous flap on 2/7/25  - TAL drain in place with serosanguinous discharge   - Management as per primary team       # Leucocytosis   - Likely reactive in setting of operative procedure.   - WBC 14.85  - Remains afebrile  - Carolin-op Ancef as per NSGY      # Hypothyroidism   - C/w levothyroxine       # TIA  - He used to take Aspirin 81 mg but stopped taking it may years ago. PCP is aware.       # Prophylactic measures -- as per primary team   - DVT PPx: Lovenox   - GI PPx: Pantoprazole    - Diet: DASH/TLC   - Activity: Advance as tolerated     Physician Handoff  - WBC. Vitals.    Spent over 35 min reviewing chart, speaking with patient/family and on coordinating patient care during interdisciplinary rounds.

## 2025-02-09 PROCEDURE — 99232 SBSQ HOSP IP/OBS MODERATE 35: CPT

## 2025-02-09 RX ORDER — L.ACID,CASEI/B.ANIMAL/S.THERMO 16B CELL
1 CAPSULE ORAL DAILY
Refills: 0 | Status: DISCONTINUED | OUTPATIENT
Start: 2025-02-09 | End: 2025-02-13

## 2025-02-09 RX ADMIN — ACETAMINOPHEN 1000 MILLIGRAM(S): 160 SUSPENSION ORAL at 21:42

## 2025-02-09 RX ADMIN — Medication 100 MILLIGRAM(S): at 21:44

## 2025-02-09 RX ADMIN — GABAPENTIN 600 MILLIGRAM(S): 800 TABLET ORAL at 21:43

## 2025-02-09 RX ADMIN — ACETAMINOPHEN 1000 MILLIGRAM(S): 160 SUSPENSION ORAL at 13:39

## 2025-02-09 RX ADMIN — GABAPENTIN 600 MILLIGRAM(S): 800 TABLET ORAL at 05:21

## 2025-02-09 RX ADMIN — Medication 750 MILLIGRAM(S): at 05:21

## 2025-02-09 RX ADMIN — Medication 100 MILLIGRAM(S): at 05:22

## 2025-02-09 RX ADMIN — Medication 2 TABLET(S): at 21:43

## 2025-02-09 RX ADMIN — Medication 100 MILLIGRAM(S): at 13:40

## 2025-02-09 RX ADMIN — ACETAMINOPHEN 1000 MILLIGRAM(S): 160 SUSPENSION ORAL at 14:09

## 2025-02-09 RX ADMIN — PANTOPRAZOLE 40 MILLIGRAM(S): 20 TABLET, DELAYED RELEASE ORAL at 05:21

## 2025-02-09 RX ADMIN — POLYETHYLENE GLYCOL 3350 17 GRAM(S): 17 POWDER, FOR SOLUTION ORAL at 11:19

## 2025-02-09 RX ADMIN — GABAPENTIN 600 MILLIGRAM(S): 800 TABLET ORAL at 13:39

## 2025-02-09 RX ADMIN — LEVOTHYROXINE SODIUM 100 MICROGRAM(S): 25 TABLET ORAL at 05:22

## 2025-02-09 RX ADMIN — ENOXAPARIN SODIUM 40 MILLIGRAM(S): 100 INJECTION SUBCUTANEOUS at 11:19

## 2025-02-09 RX ADMIN — Medication 750 MILLIGRAM(S): at 21:43

## 2025-02-09 RX ADMIN — Medication 1 TABLET(S): at 11:19

## 2025-02-09 RX ADMIN — ACETAMINOPHEN 1000 MILLIGRAM(S): 160 SUSPENSION ORAL at 05:22

## 2025-02-09 RX ADMIN — Medication 750 MILLIGRAM(S): at 13:39

## 2025-02-09 NOTE — PHYSICAL THERAPY INITIAL EVALUATION ADULT - GENERAL OBSERVATIONS, REHAB EVAL
Pt encountered sitting in bedside chair +drain in NAd and agreeable to PT. Pt complains of a headache and pain in his posterior upper thighs and glutes, RN aware. Pt performs sit to stand transfer with supervision, chair transfers, and ambulation with RW and supervision. Pt left as found in NAD +wound drain, with RN at bedside.

## 2025-02-09 NOTE — PHYSICAL THERAPY INITIAL EVALUATION ADULT - PERTINENT HX OF CURRENT PROBLEM, REHAB EVAL
The patient reports pain in the left lower leg and reports this pain as radiating, pins and needles sensation, numbness down the left leg. The patient reports aches and soreness in the cervical region of spine and down to lumbar region of spine. Reports that pain radiates to the left hip. The patient states that the pain began approximately one year ago, and states that the pain progressively worsened with time.  Reports difficulties getting out of bed over the last week due to pain in the neck and back. Rates the pain a 10/10 on the pain scale, intermittent. The patient reports that activity worsens pain and pain medication as well rest relieves discomfort.

## 2025-02-09 NOTE — PHYSICAL THERAPY INITIAL EVALUATION ADULT - GAIT TRAINING, PT EVAL
Pt will ambulate 350ft with RW independently by d/c. Pt will navigate up and down 10 steps with hand rail and supervision by d/c.

## 2025-02-09 NOTE — PROGRESS NOTE ADULT - SUBJECTIVE AND OBJECTIVE BOX
SUBJECTIVE:   DARON ORTEGA (74y Male) was seen ambulating on hallways with rolling walker. He is in good spirit. Pain is well controlled.     REVIEW OF SYSTEM:   RESPIRATORY: No cough, wheezing, hemoptysis, or shortness of breath  CARDIOVASCULAR: No chest pain, palpitations, or leg swelling  GASTROINTESTINAL: No abdominal pain, abdominal distension, nausea, vomiting, hematemesis, diarrhea, constipation, melena, or hematochezia.  GENITOURINARY: No dysuria, or flank tenderness   NEUROLOGICAL: No headaches, loss of strength, loss of sensation, slurred speech, or tremors  MUSCULOSKELETAL: No joint pain, joint swelling, back pain    PHYSICAL EXAM:  HEENT: clear conjunctiva pupils bilaterally equal and reactive to light  NERVOUS SYSTEM:  Alert & Oriented to name/place/person. Moves all extremities. No sensory deficits. No tremors. No CN deficits.   LUNG: Bilateral clear air entry. No wheezing or crackles.   HEART: S1, S2 audible. No abnormal cardiac murmur.   ABDOMEN: Soft, nontender, nondistended, BS+  EXTREMITIES:  DP pedis pules palpable, no edema, no cyanosis, warm to touch.   SKIN: No rashes, or lesions  TAL drain with sero-sanguinous discharge.     Vital Signs Last 24 Hrs  T(C): 36.7 (09 Feb 2025 07:50), Max: 36.7 (08 Feb 2025 16:12)  T(F): 98.1 (09 Feb 2025 07:50), Max: 98.1 (09 Feb 2025 07:50)  HR: 74 (09 Feb 2025 07:50) (74 - 89)  BP: 146/76 (09 Feb 2025 07:50) (120/77 - 146/76)  BP(mean): --  RR: 18 (09 Feb 2025 07:50) (18 - 19)  SpO2: 97% (09 Feb 2025 07:50) (97% - 100%)    Parameters below as of 08 Feb 2025 16:12  Patient On (Oxygen Delivery Method): room air    MEDICATIONS  (STANDING):  acetaminophen     Tablet .. 1000 milliGRAM(s) Oral every 8 hours  ceFAZolin   IVPB 1000 milliGRAM(s) IV Intermittent every 8 hours  enoxaparin Injectable 40 milliGRAM(s) SubCutaneous every 24 hours  gabapentin 600 milliGRAM(s) Oral three times a day  influenza  Vaccine (HIGH DOSE) 0.5 milliLiter(s) IntraMuscular once  lactobacillus acidophilus 1 Tablet(s) Oral daily  levothyroxine 100 MICROGram(s) Oral daily  methocarbamol 750 milliGRAM(s) Oral every 8 hours  pantoprazole    Tablet 40 milliGRAM(s) Oral before breakfast  polyethylene glycol 3350 17 Gram(s) Oral daily  senna 2 Tablet(s) Oral at bedtime  sodium chloride 0.9%. 1000 milliLiter(s) (75 mL/Hr) IV Continuous <Continuous>    MEDICATIONS  (PRN):  ondansetron Injectable 4 milliGRAM(s) IV Push every 6 hours PRN Nausea and/or Vomiting  oxyCODONE    IR 5 milliGRAM(s) Oral every 4 hours PRN Moderate Pain (4 - 6)  oxyCODONE    IR 10 milliGRAM(s) Oral every 4 hours PRN Severe Pain (7 - 10)

## 2025-02-09 NOTE — PROGRESS NOTE ADULT - ASSESSMENT
DARON ORTEGA (74y Male) with PMH of hypothyroidism, TIA, and lumbar spinal stenosis underwent L2-3, L3-4, L4-5, L5-S1 Bilateral laminectomy with closure of spinal surgical wound w/ myocutaneous flap on 2/7/25. He is admitted under neurosurgical service for post-op care.    Hospital medicine is consulted for co-management of medical co-morbidities.       # Lumbar spinal stenosis   # L2-3, L3-4, L4-5, L5-S1 Bilateral laminectomy with closure of spinal surgical wound w/ myocutaneous flap on 2/7/25  - TAL drain in place with serosanguinous discharge   - Pain Mx as per primary team       # Leucocytosis   - Likely reactive in setting of operative procedure.   - WBC 14.85. Recheck CBC tomorrow AM  - Remains afebrile  - Carolin-op Ancef as per NSGY      # Hypothyroidism   - C/w levothyroxine       # TIA  - He used to take Aspirin 81 mg but stopped taking it may years ago. PCP is aware.       # Prophylactic measures -- as per primary team   - DVT PPx: Lovenox   - GI PPx: Pantoprazole.   - Bowel regime: miralax and senna   - Diet: DASH/TLC   - Activity: Advance as tolerated     Physician Handoff  - WBC.   - SBP 140s. No prior h/o HTN. Monitor off antihypertensives for now    Spent over 35 min reviewing chart, speaking with patient/family and on coordinating patient care during interdisciplinary rounds.

## 2025-02-09 NOTE — PHYSICAL THERAPY INITIAL EVALUATION ADULT - ADDITIONAL COMMENTS
Pt reports that PTA he was fully independent, living with his family in a private home with 5 BRADY and 16 steps inside to the second floor. Pt was not using an AD prior to surgery.

## 2025-02-09 NOTE — PROGRESS NOTE ADULT - SUBJECTIVE AND OBJECTIVE BOX
POD#2    S/P L2-3, L3-4, L4-5, L5-S1 Bilateral laminectomy with plastics closure    Pt seen and examined at bedside. Pt c/o incisional pain. Denies LE radiculopathy, parasthesias.    Vital Signs Last 24 Hrs  T(C): 36.7 (09 Feb 2025 07:50), Max: 36.7 (08 Feb 2025 11:37)  T(F): 98.1 (09 Feb 2025 07:50), Max: 98.1 (09 Feb 2025 07:50)  HR: 74 (09 Feb 2025 07:50) (74 - 89)  BP: 146/76 (09 Feb 2025 07:50) (114/62 - 146/76)  BP(mean): --  RR: 18 (09 Feb 2025 07:50) (18 - 19)  SpO2: 97% (09 Feb 2025 07:50) (97% - 100%)    Parameters below as of 08 Feb 2025 16:12  Patient On (Oxygen Delivery Method): room air        I&O's Detail    08 Feb 2025 07:01  -  09 Feb 2025 07:00  --------------------------------------------------------  IN:  Total IN: 0 mL    OUT:    Bulb (mL): 105 mL    Voided (mL): 1200 mL  Total OUT: 1305 mL    Total NET: -1305 mL        I&O's Summary    08 Feb 2025 07:01  -  09 Feb 2025 07:00  --------------------------------------------------------  IN: 0 mL / OUT: 1305 mL / NET: -1305 mL        REVIEW OF SYSTEMS    [X] A ten-point review of systems was otherwise negative except as noted.  [ ] Due to altered mental status/intubation, subjective information were not able to be obtained from the patient. History was obtained, to the extent possible, from review of the chart and collateral sources of information.      PHYSICAL EXAM:  Neurological:  Strength 5/5 B/L LE  Sensation equal and intact to light touch  KJ 2+ B/L  Dressing intact  Drain in place    LABS:                        13.7   14.53 )-----------( 309      ( 08 Feb 2025 05:39 )             40.6     02-08    139  |  106  |  10  ----------------------------<  126[H]  4.6   |  23  |  0.8    Ca    9.0      08 Feb 2025 05:39    TPro  6.1  /  Alb  3.8  /  TBili  0.8  /  DBili  x   /  AST  19  /  ALT  16  /  AlkPhos  70  02-08      Urinalysis Basic - ( 08 Feb 2025 05:39 )    Color: x / Appearance: x / SG: x / pH: x  Gluc: 126 mg/dL / Ketone: x  / Bili: x / Urobili: x   Blood: x / Protein: x / Nitrite: x   Leuk Esterase: x / RBC: x / WBC x   Sq Epi: x / Non Sq Epi: x / Bacteria: x      Allergies    contrast media (iodine-based) (Unknown)    Intolerances      MEDICATIONS:  Antibiotics:  ceFAZolin   IVPB 1000 milliGRAM(s) IV Intermittent every 8 hours    Neuro:  acetaminophen     Tablet .. 1000 milliGRAM(s) Oral every 8 hours  gabapentin 600 milliGRAM(s) Oral three times a day  methocarbamol 750 milliGRAM(s) Oral every 8 hours  ondansetron Injectable 4 milliGRAM(s) IV Push every 6 hours PRN  oxyCODONE    IR 5 milliGRAM(s) Oral every 4 hours PRN  oxyCODONE    IR 10 milliGRAM(s) Oral every 4 hours PRN      IVF:  sodium chloride 0.9%. 1000 milliLiter(s) IV Continuous <Continuous>    ASSESSMENT:  74y Male s/p    Lumbar radiculopathy    Handoff    MEWS Score    Hyperlipidemia    Hypothyroidism    Stroke    Chronic back pain    Prostate cancer    Neurogenic claudication due to lumbar spinal stenosis    Neurogenic claudication due to lumbar spinal stenosis    Laminectomy, spine, lumbar, 4 levels, with discectomy    Myocutaneous flap of back    History of hip surgery    [id number]; [id number]    [email or web address]        PLAN:  Pain Meds PRN  PT/OT  Continue IV Antibiotics  Monitor drain o/p

## 2025-02-09 NOTE — PHYSICAL THERAPY INITIAL EVALUATION ADULT - MD ORDER
Laminectomy, spine, lumbar, 4 levels, with discectomy, Rashad Guillory (Attending)  Myocutaneous flap of back, Jd Olmos (Attending) Thee mEerson (Resident)

## 2025-02-09 NOTE — PROGRESS NOTE ADULT - SUBJECTIVE AND OBJECTIVE BOX
PLASTICS SURGERY PROGRESS NOTE     JORDAN [address] day :3d    POD: 2d  Procedure: Laminectomy, spine, lumbar, 4 levels, with discectomy    Myocutaneous flap of back      Surgical Attending: Dr. Olmos  24 hour events: back closure dressing intact. Tal drain ss output. OOBTC, no acute events overnight    PHYSICAL EXAM:  GENERAL: NAD  CHEST/LUNG: Equal chest rise b/l, non labored breathing   HEART: Regular rate and rhythm  ABDOMEN: Soft, Nontender, Nondistended;   EXTREMITIES:  No clubbing, cyanosis, or edema  BACK: Mepilex dressing in place C/D/I, TAL drain with serosanguinous output      T(F): 98 (02-09-25 @ 00:21), Max: 98.1 (02-08-25 @ 04:42)  HR: 80 (02-09-25 @ 00:21) (68 - 89)  BP: 120/77 (02-09-25 @ 00:21) (114/62 - 131/76)  ABP: --  ABP(mean): --  RR: 18 (02-09-25 @ 00:21) (18 - 19)  SpO2: 97% (02-09-25 @ 00:21) (97% - 100%)    IN'S / OUT's:    02-07-25 @ 07:01  -  02-08-25 @ 07:00  --------------------------------------------------------  IN:    sodium chloride 0.9%: 225 mL  Total IN: 225 mL    OUT:    Bulb (mL): 140 mL    Voided (mL): 500 mL  Total OUT: 640 mL    Total NET: -415 mL      02-08-25 @ 07:01  -  02-09-25 @ 00:42  --------------------------------------------------------  IN:  Total IN: 0 mL    OUT:    Bulb (mL): 85 mL    Voided (mL): 1200 mL  Total OUT: 1285 mL    Total NET: -1285 mL          MEDICATIONS  (STANDING):  acetaminophen     Tablet .. 1000 milliGRAM(s) Oral every 8 hours  ceFAZolin   IVPB 1000 milliGRAM(s) IV Intermittent every 8 hours  enoxaparin Injectable 40 milliGRAM(s) SubCutaneous every 24 hours  gabapentin 600 milliGRAM(s) Oral three times a day  influenza  Vaccine (HIGH DOSE) 0.5 milliLiter(s) IntraMuscular once  levothyroxine 100 MICROGram(s) Oral daily  methocarbamol 750 milliGRAM(s) Oral every 8 hours  pantoprazole    Tablet 40 milliGRAM(s) Oral before breakfast  polyethylene glycol 3350 17 Gram(s) Oral daily  senna 2 Tablet(s) Oral at bedtime  sodium chloride 0.9%. 1000 milliLiter(s) (75 mL/Hr) IV Continuous <Continuous>    MEDICATIONS  (PRN):  ondansetron Injectable 4 milliGRAM(s) IV Push every 6 hours PRN Nausea and/or Vomiting  oxyCODONE    IR 5 milliGRAM(s) Oral every 4 hours PRN Moderate Pain (4 - 6)  oxyCODONE    IR 10 milliGRAM(s) Oral every 4 hours PRN Severe Pain (7 - 10)      LABS  Labs:  CAPILLARY BLOOD GLUCOSE                              13.7   14.53 )-----------( 309      ( 08 Feb 2025 05:39 )             40.6         02-08    139  |  106  |  10  ----------------------------<  126[H]  4.6   |  23  |  0.8      Calcium: 9.0 mg/dL (02-08-25 @ 05:39)      LFTs:             6.1  | 0.8  | 19       ------------------[70      ( 08 Feb 2025 05:39 )  3.8  | x    | 16          Lipase:x      Amylase:x             Coags:            Urinalysis Basic - ( 08 Feb 2025 05:39 )    Color: x / Appearance: x / SG: x / pH: x  Gluc: 126 mg/dL / Ketone: x  / Bili: x / Urobili: x   Blood: x / Protein: x / Nitrite: x   Leuk Esterase: x / RBC: x / WBC x   Sq Epi: x / Non Sq Epi: x / Bacteria: x            RADIOLOGY & ADDITIONAL TESTS:

## 2025-02-09 NOTE — PHYSICAL THERAPY INITIAL EVALUATION ADULT - RANGE OF MOTION EXAMINATION, REHAB EVAL
Head is atraumatic. Head shape is symmetrical.
BUE and BLE grossly 4/5/no ROM deficits were identified

## 2025-02-09 NOTE — PROGRESS NOTE ADULT - ASSESSMENT
74M, POD#0 S/P L2-3, L3-4, L4-5, L5-S1 Bilateral laminectomy with closure of spinal surgical wound w/ myocutaneous flap on 2/7        PLAN:   - monitor TAL drain output  - continue Abx  - multi modal pain control  - daily labs, replete electrolytes as needed   - Rest of care per primary team

## 2025-02-10 ENCOUNTER — TRANSCRIPTION ENCOUNTER (OUTPATIENT)
Age: 75
End: 2025-02-10

## 2025-02-10 LAB
HCT VFR BLD CALC: 38.8 % — LOW (ref 42–52)
HGB BLD-MCNC: 13.1 G/DL — LOW (ref 14–18)
MCHC RBC-ENTMCNC: 30.6 PG — SIGNIFICANT CHANGE UP (ref 27–31)
MCHC RBC-ENTMCNC: 33.8 G/DL — SIGNIFICANT CHANGE UP (ref 32–37)
MCV RBC AUTO: 90.7 FL — SIGNIFICANT CHANGE UP (ref 80–94)
NRBC # BLD: 0 /100 WBCS — SIGNIFICANT CHANGE UP (ref 0–0)
NRBC BLD-RTO: 0 /100 WBCS — SIGNIFICANT CHANGE UP (ref 0–0)
PLATELET # BLD AUTO: 254 K/UL — SIGNIFICANT CHANGE UP (ref 130–400)
PMV BLD: 9.9 FL — SIGNIFICANT CHANGE UP (ref 7.4–10.4)
RBC # BLD: 4.28 M/UL — LOW (ref 4.7–6.1)
RBC # FLD: 12.7 % — SIGNIFICANT CHANGE UP (ref 11.5–14.5)
TSH SERPL-MCNC: 0.25 UIU/ML — LOW (ref 0.27–4.2)
WBC # BLD: 8.79 K/UL — SIGNIFICANT CHANGE UP (ref 4.8–10.8)
WBC # FLD AUTO: 8.79 K/UL — SIGNIFICANT CHANGE UP (ref 4.8–10.8)

## 2025-02-10 PROCEDURE — 99233 SBSQ HOSP IP/OBS HIGH 50: CPT

## 2025-02-10 RX ORDER — BUTALB/ACETAMINOPHEN/CAFFEINE 50-325-40
1 TABLET ORAL EVERY 6 HOURS
Refills: 0 | Status: DISCONTINUED | OUTPATIENT
Start: 2025-02-10 | End: 2025-02-13

## 2025-02-10 RX ORDER — FOLIC ACID 1 MG
1 TABLET ORAL DAILY
Refills: 0 | Status: DISCONTINUED | OUTPATIENT
Start: 2025-02-10 | End: 2025-02-13

## 2025-02-10 RX ORDER — CHLORDIAZEPOXIDE HYDROCHLORIDE 25 MG/1
CAPSULE ORAL
Refills: 0 | Status: DISCONTINUED | OUTPATIENT
Start: 2025-02-10 | End: 2025-02-12

## 2025-02-10 RX ORDER — CHLORDIAZEPOXIDE HYDROCHLORIDE 25 MG/1
50 CAPSULE ORAL EVERY 8 HOURS
Refills: 0 | Status: DISCONTINUED | OUTPATIENT
Start: 2025-02-11 | End: 2025-02-12

## 2025-02-10 RX ORDER — CHLORDIAZEPOXIDE HYDROCHLORIDE 25 MG/1
50 CAPSULE ORAL EVERY 6 HOURS
Refills: 0 | Status: DISCONTINUED | OUTPATIENT
Start: 2025-02-10 | End: 2025-02-11

## 2025-02-10 RX ORDER — CHLORDIAZEPOXIDE HYDROCHLORIDE 25 MG/1
50 CAPSULE ORAL EVERY 12 HOURS
Refills: 0 | Status: DISCONTINUED | OUTPATIENT
Start: 2025-02-12 | End: 2025-02-12

## 2025-02-10 RX ORDER — MECOBAL/LEVOMEFOLAT CA/B6 PHOS 2-3-35 MG
1 TABLET ORAL DAILY
Refills: 0 | Status: DISCONTINUED | OUTPATIENT
Start: 2025-02-10 | End: 2025-02-13

## 2025-02-10 RX ORDER — THIAMINE HCL 100 MG
100 TABLET ORAL DAILY
Refills: 0 | Status: COMPLETED | OUTPATIENT
Start: 2025-02-10 | End: 2025-02-13

## 2025-02-10 RX ORDER — BACTERIOSTATIC SODIUM CHLORIDE 0.9 %
1000 VIAL (ML) INJECTION
Refills: 0 | Status: DISCONTINUED | OUTPATIENT
Start: 2025-02-10 | End: 2025-02-12

## 2025-02-10 RX ADMIN — GABAPENTIN 600 MILLIGRAM(S): 800 TABLET ORAL at 05:07

## 2025-02-10 RX ADMIN — Medication 750 MILLIGRAM(S): at 13:36

## 2025-02-10 RX ADMIN — ACETAMINOPHEN 1000 MILLIGRAM(S): 160 SUSPENSION ORAL at 05:07

## 2025-02-10 RX ADMIN — PANTOPRAZOLE 40 MILLIGRAM(S): 20 TABLET, DELAYED RELEASE ORAL at 05:08

## 2025-02-10 RX ADMIN — Medication 2 TABLET(S): at 22:35

## 2025-02-10 RX ADMIN — Medication 1 TABLET(S): at 11:55

## 2025-02-10 RX ADMIN — Medication 1 MILLIGRAM(S): at 13:36

## 2025-02-10 RX ADMIN — Medication 1 TABLET(S): at 13:36

## 2025-02-10 RX ADMIN — Medication 750 MILLIGRAM(S): at 05:05

## 2025-02-10 RX ADMIN — CHLORDIAZEPOXIDE HYDROCHLORIDE 50 MILLIGRAM(S): 25 CAPSULE ORAL at 15:10

## 2025-02-10 RX ADMIN — ACETAMINOPHEN 1000 MILLIGRAM(S): 160 SUSPENSION ORAL at 23:10

## 2025-02-10 RX ADMIN — Medication 750 MILLIGRAM(S): at 22:35

## 2025-02-10 RX ADMIN — Medication 100 MILLIGRAM(S): at 13:33

## 2025-02-10 RX ADMIN — GABAPENTIN 600 MILLIGRAM(S): 800 TABLET ORAL at 13:36

## 2025-02-10 RX ADMIN — CHLORDIAZEPOXIDE HYDROCHLORIDE 50 MILLIGRAM(S): 25 CAPSULE ORAL at 22:35

## 2025-02-10 RX ADMIN — LEVOTHYROXINE SODIUM 100 MICROGRAM(S): 25 TABLET ORAL at 05:05

## 2025-02-10 RX ADMIN — ACETAMINOPHEN 1000 MILLIGRAM(S): 160 SUSPENSION ORAL at 13:35

## 2025-02-10 RX ADMIN — POLYETHYLENE GLYCOL 3350 17 GRAM(S): 17 POWDER, FOR SOLUTION ORAL at 11:55

## 2025-02-10 RX ADMIN — Medication 100 MILLIGRAM(S): at 13:36

## 2025-02-10 RX ADMIN — ENOXAPARIN SODIUM 40 MILLIGRAM(S): 100 INJECTION SUBCUTANEOUS at 11:56

## 2025-02-10 RX ADMIN — GABAPENTIN 600 MILLIGRAM(S): 800 TABLET ORAL at 22:35

## 2025-02-10 RX ADMIN — Medication 100 MILLIGRAM(S): at 05:05

## 2025-02-10 RX ADMIN — ACETAMINOPHEN 1000 MILLIGRAM(S): 160 SUSPENSION ORAL at 22:34

## 2025-02-10 RX ADMIN — Medication 100 MILLIGRAM(S): at 22:35

## 2025-02-10 NOTE — PROGRESS NOTE ADULT - SUBJECTIVE AND OBJECTIVE BOX
POD#3    S/P L2-3, L3-4, L4-5, L5-S1 Bilateral laminectomy with plastics closure    Pt seen and examined at bedside. Pt c/o incisional pain. Denied HA. Pt noted to have significant tremors in his hands. Sts he drinks 1 beer daily but used to drink more. Sts has recently started to have episodes of tremors.    Vital Signs Last 24 Hrs  T(C): 36.7 (10 Feb 2025 07:39), Max: 36.9 (09 Feb 2025 15:53)  T(F): 98.1 (10 Feb 2025 07:39), Max: 98.5 (09 Feb 2025 15:53)  HR: 85 (10 Feb 2025 07:39) (80 - 86)  BP: 120/68 (10 Feb 2025 07:39) (102/60 - 156/89)  BP(mean): --  RR: 18 (10 Feb 2025 07:39) (18 - 19)  SpO2: 98% (10 Feb 2025 07:39) (96% - 99%)    Parameters below as of 09 Feb 2025 15:53  Patient On (Oxygen Delivery Method): room air    I&O's Detail    09 Feb 2025 07:01  -  10 Feb 2025 07:00  --------------------------------------------------------  IN:  Total IN: 0 mL    OUT:    Bulb (mL): 67.5 mL    Voided (mL): 900 mL  Total OUT: 967.5 mL    Total NET: -967.5 mL        I&O's Summary    09 Feb 2025 07:01  -  10 Feb 2025 07:00  --------------------------------------------------------  IN: 0 mL / OUT: 967.5 mL / NET: -967.5 mL        REVIEW OF SYSTEMS    [X] A ten-point review of systems was otherwise negative except as noted.  [ ] Due to altered mental status/intubation, subjective information were not able to be obtained from the patient. History was obtained, to the extent possible, from review of the chart and collateral sources of information.      PHYSICAL EXAM:  Neurological:  Strength 5/5 B/L LE  Sensation equal and intact to light touch  KJ 2+ B/L   Drain in place  Dressing intact      LABS:                        13.1   8.79  )-----------( 254      ( 10 Feb 2025 05:24 )             38.8       Allergies    contrast media (iodine-based) (Unknown)    Intolerances      MEDICATIONS:  Antibiotics:  ceFAZolin   IVPB 1000 milliGRAM(s) IV Intermittent every 8 hours    Neuro:  acetaminophen     Tablet .. 1000 milliGRAM(s) Oral every 8 hours  gabapentin 600 milliGRAM(s) Oral three times a day  LORazepam     Tablet 1 milliGRAM(s) Oral every 4 hours PRN  methocarbamol 750 milliGRAM(s) Oral every 8 hours  ondansetron Injectable 4 milliGRAM(s) IV Push every 6 hours PRN  oxyCODONE    IR 5 milliGRAM(s) Oral every 4 hours PRN  oxyCODONE    IR 10 milliGRAM(s) Oral every 4 hours PRN      IVF:  folic acid 1 milliGRAM(s) Oral daily  multivitamin 1 Tablet(s) Oral daily  thiamine 100 milliGRAM(s) Oral daily      ASSESSMENT:  74y Male s/p    Lumbar radiculopathy    Handoff    MEWS Score    Hyperlipidemia    Hypothyroidism    Stroke    Chronic back pain    Prostate cancer    Neurogenic claudication due to lumbar spinal stenosis    Neurogenic claudication due to lumbar spinal stenosis    Laminectomy, spine, lumbar, 4 levels, with discectomy    Myocutaneous flap of back    History of hip surgery    [id number]; [id number]    SysAdmin_VstLnk        PLAN:  Appreciate Hospitalist input.   Propanolol q12 and Ativan PRN  Fioricet PRN

## 2025-02-10 NOTE — OCCUPATIONAL THERAPY INITIAL EVALUATION ADULT - NSACTIVITYREC_GEN_A_OT
Patient requires assistance with activities of daily living. OT recommends D/C to home with assistance when medically appropriate. OT recommends shower chair for D/C to home. Refer to evaluation/treatment for details.

## 2025-02-10 NOTE — OCCUPATIONAL THERAPY INITIAL EVALUATION ADULT - ADDITIONAL COMMENTS
Pt resides in private [facility] home with spouse and adult son, + driving, + stall shower with grab bar

## 2025-02-10 NOTE — DISCHARGE NOTE NURSING/CASE MANAGEMENT/SOCIAL WORK - PATIENT PORTAL LINK FT
You can access the FollowMyHealth Patient Portal offered by [facility] by registering at the following website: [email or web address]. By joining [employer]’s FollowMyHealth portal, you will also be able to view your health information using other applications (apps) compatible with our system.

## 2025-02-10 NOTE — PROGRESS NOTE ADULT - SUBJECTIVE AND OBJECTIVE BOX
SUBJECTIVE:   DARON ORTEGA (74y Male) was seen at bedside. He is tremulous and is anxious. No focal deficits. Denies hallucinations. He endorsed drinking 1-2 beers/day. Denies prior h/o alcohol withdrawal. Mentions that he had tremors before.      REVIEW OF SYSTEM:   RESPIRATORY: No cough, wheezing, hemoptysis, or shortness of breath  CARDIOVASCULAR: No chest pain, palpitations, or leg swelling  GASTROINTESTINAL: No abdominal pain, abdominal distension, nausea, vomiting, hematemesis, diarrhea, constipation, melena, or hematochezia.  GENITOURINARY: No dysuria, or flank tenderness   NEUROLOGICAL: Tremors. No headaches, loss of strength, loss of sensation, slurred speech  MUSCULOSKELETAL: No joint pain, joint swelling, back pain    PHYSICAL EXAM:  GENERAL: anxious, well-built  HEENT: clear conjunctiva pupils bilaterally equal and reactive to light  NERVOUS SYSTEM:  Alert & Oriented to name/place/person. Moves all extremities. No sensory deficits. No CN deficits. Resting tremors.   LUNG: Bilateral clear air entry. No wheezing or crackles.   HEART: S1, S2 audible. No abnormal cardiac murmur.   ABDOMEN: Soft, nontender, nondistended, BS+  EXTREMITIES:  DP pedis pules palpable, no edema, no cyanosis, warm to touch.   SKIN: No rashes, or lesions  TAL drain in place.     Vital Signs Last 24 Hrs  T(C): 36.7 (10 Feb 2025 07:39), Max: 36.9 (09 Feb 2025 15:53)  T(F): 98.1 (10 Feb 2025 07:39), Max: 98.5 (09 Feb 2025 15:53)  HR: 85 (10 Feb 2025 07:39) (80 - 86)  BP: 120/68 (10 Feb 2025 07:39) (102/60 - 156/89)  BP(mean): --  RR: 18 (10 Feb 2025 07:39) (18 - 19)  SpO2: 98% (10 Feb 2025 07:39) (96% - 99%)    Parameters below as of 09 Feb 2025 15:53  Patient On (Oxygen Delivery Method): room air    MEDICATIONS  (STANDING):  acetaminophen     Tablet .. 1000 milliGRAM(s) Oral every 8 hours  ceFAZolin   IVPB 1000 milliGRAM(s) IV Intermittent every 8 hours  chlordiazePOXIDE   Oral   enoxaparin Injectable 40 milliGRAM(s) SubCutaneous every 24 hours  folic acid 1 milliGRAM(s) Oral daily  gabapentin 600 milliGRAM(s) Oral three times a day  influenza  Vaccine (HIGH DOSE) 0.5 milliLiter(s) IntraMuscular once  lactobacillus acidophilus 1 Tablet(s) Oral daily  levothyroxine 100 MICROGram(s) Oral daily  methocarbamol 750 milliGRAM(s) Oral every 8 hours  multivitamin 1 Tablet(s) Oral daily  pantoprazole    Tablet 40 milliGRAM(s) Oral before breakfast  polyethylene glycol 3350 17 Gram(s) Oral daily  propranolol 10 milliGRAM(s) Oral two times a day  senna 2 Tablet(s) Oral at bedtime  thiamine 100 milliGRAM(s) Oral daily    MEDICATIONS  (PRN):  ondansetron Injectable 4 milliGRAM(s) IV Push every 6 hours PRN Nausea and/or Vomiting  oxyCODONE    IR 5 milliGRAM(s) Oral every 4 hours PRN Moderate Pain (4 - 6)  oxyCODONE    IR 10 milliGRAM(s) Oral every 4 hours PRN Severe Pain (7 - 10)

## 2025-02-10 NOTE — DISCHARGE NOTE NURSING/CASE MANAGEMENT/SOCIAL WORK - FINANCIAL ASSISTANCE
[facility] provides services at a reduced cost to those who are determined to be eligible through [facility]’s financial assistance program. Information regarding [facility]’s financial assistance program can be found by going to [email or web address] or by calling [phone].

## 2025-02-10 NOTE — PROGRESS NOTE ADULT - ASSESSMENT
DARON ORTEGA (74y Male) with PMH of hypothyroidism, TIA, and lumbar spinal stenosis underwent L2-3, L3-4, L4-5, L5-S1 Bilateral laminectomy with closure of spinal surgical wound w/ myocutaneous flap on 2/7/25. He is admitted under neurosurgical service for post-op care. 02/10/25, he started developing tremors and anxiety for which he started on Librium for concerns of alcohol withdrawal.     Hospital medicine is consulted for co-management of medical co-morbidities.       # Lumbar spinal stenosis   # L2-3, L3-4, L4-5, L5-S1 Bilateral laminectomy with closure of spinal surgical wound w/ myocutaneous flap on 2/7/25  - TAL drain in place with serosanguinous discharge   - Pain Mx as per primary team     # Bilateral hand tremors   # Alcohol withdrawal   - On 02/10/25: Patient started developing bilateral hand tremors and anxiety.   - Admits to heavy drinking habit in the past which he has cut down to 1-2 beers/day. Last use of alcohol prior to presentation (02/06/25). Denies liver disease. Denies prior episodes of alcohol withdrawal.  - Start Librium taper  - Folic acid, thiamine, multi-vitamins  - IV fluids  - Alcohol withdrawal precautions  - Seizure precautions  - Check TSH, BMP, LFT, Alice, Mg   - Propranolol 10 mg BID -- unclear if he has superimposed component of essential tremors, as he mentioned he has h/o tremors for months in the past.     # Intermittent headache   # Caffeine withdrawal   - Suspect caffeine withdrawal. Patient used to drink 6-7 caffeinated drinks/day. Now he endorsed headache and feeling "groggy" which resolved after drinking coffee.   - Low suspicion of CSF leak as per NSGY  - Can offer Fioricet PRN for headache.     # Leucocytosis - resolved   - Likely reactive in setting of operative procedure.   - WBC 14.85. Recheck CBC tomorrow AM  - Remains afebrile  - Carolin-op Ancef as per NSGY    # Hypothyroidism   - C/w levothyroxine     # TIA  - He used to take Aspirin 81 mg but stopped taking it may years ago. PCP is aware.     # Prophylactic measures -- as per primary team   - DVT PPx: Lovenox   - GI PPx: Pantoprazole.   - Bowel regime: miralax and senna   - Diet: DASH/TLC   - Activity: Advance as tolerated     Physician Handoff  - Tremors.      Spent over 55 min reviewing chart, speaking with patient/family and on coordinating patient care during interdisciplinary rounds.

## 2025-02-10 NOTE — OCCUPATIONAL THERAPY INITIAL EVALUATION ADULT - GENERAL OBSERVATIONS, REHAB EVAL
Pt encountered in bathroom with PCA, +IV locked, + TAL drain. Pt agreeable to bedside OT assessment, may be seen as confirmed with RN. Pt returned to bedside recliner, + IV locked, + TAL drain, + chair alarm

## 2025-02-10 NOTE — OCCUPATIONAL THERAPY INITIAL EVALUATION ADULT - TRANSFER TRAINING, PT EVAL
Pt will perform sit<>stand, bed<>chair and toilet transfers with supervision by discharge using most appropriate AD and DME

## 2025-02-10 NOTE — PROGRESS NOTE ADULT - SUBJECTIVE AND OBJECTIVE BOX
Patient is a 74y old  Male who presents with a chief complaint of back pain     HPI: 73 yo M  reports pain in the left lower leg and reports this pain as radiating, pins and needles sensation, numbness down the left leg. The patient reports aches and soreness in the cervical region of spine and down to lumbar region of spine. Reports that pain radiates to the left hip. The patient states that the pain began approximately one year ago, and states that the pain progressively worsened with time.  Reports difficulties getting out of bed over the last week due to pain in the neck and back. Rates the pain a 10/10 on the pain scale, intermittent. The patient reports that activity worsens pain and pain medication as well rest relieves discomfort.     Pt admitted to [facility] on 2/7/25 for lumbar stenosis and underwent s/p L2-3, L3-4, L4-5, L5-S1 bilateral laminectomy, foraminotomy, discectomy and Closure of spinal surgical wound with myocutaneous flap.            PHYSICAL EXAM    Vital Signs Last 24 Hrs  T(C): 36.7 (10 Feb 2025 07:39), Max: 36.9 (09 Feb 2025 15:53)  T(F): 98.1 (10 Feb 2025 07:39), Max: 98.5 (09 Feb 2025 15:53)  HR: 85 (10 Feb 2025 07:39) (80 - 86)  BP: 120/68 (10 Feb 2025 07:39) (102/60 - 156/89)  BP(mean): --  RR: 18 (10 Feb 2025 07:39) (18 - 19)  SpO2: 98% (10 Feb 2025 07:39) (96% - 99%)    Parameters below as of 09 Feb 2025 15:53  Patient On (Oxygen Delivery Method): room air        Constitutional - NAD  Chest - CTA  Cardiovascular - S1S2+  Abdomen -  Soft  Extremities -  No calf tenderness   Function : transfer sup               ambulate 300'RW sup    acetaminophen     Tablet .. 1000 milliGRAM(s) Oral every 8 hours  ceFAZolin   IVPB 1000 milliGRAM(s) IV Intermittent every 8 hours  chlordiazePOXIDE   Oral   chlordiazePOXIDE 50 milliGRAM(s) Oral every 6 hours  enoxaparin Injectable 40 milliGRAM(s) SubCutaneous every 24 hours  folic acid 1 milliGRAM(s) Oral daily  gabapentin 600 milliGRAM(s) Oral three times a day  influenza  Vaccine (HIGH DOSE) 0.5 milliLiter(s) IntraMuscular once  lactobacillus acidophilus 1 Tablet(s) Oral daily  levothyroxine 100 MICROGram(s) Oral daily  methocarbamol 750 milliGRAM(s) Oral every 8 hours  multivitamin 1 Tablet(s) Oral daily  ondansetron Injectable 4 milliGRAM(s) IV Push every 6 hours PRN  oxyCODONE    IR 5 milliGRAM(s) Oral every 4 hours PRN  oxyCODONE    IR 10 milliGRAM(s) Oral every 4 hours PRN  pantoprazole    Tablet 40 milliGRAM(s) Oral before breakfast  polyethylene glycol 3350 17 Gram(s) Oral daily  propranolol 10 milliGRAM(s) Oral two times a day  senna 2 Tablet(s) Oral at bedtime  sodium chloride 0.9%. 1000 milliLiter(s) IV Continuous <Continuous>  thiamine 100 milliGRAM(s) Oral daily      RECENT LABS/IMAGING                        13.1   8.79  )-----------( 254      ( 10 Feb 2025 05:24 )             38.8

## 2025-02-10 NOTE — PROGRESS NOTE ADULT - ASSESSMENT
Imp: Rehab of lumbar stenosis / s/p L2-S1 laminectomy and wound closure with myocutaneous flap   Plan: continue bedside therapy as tolerated            high functioning            d/c  home with services when medically cleared

## 2025-02-11 PROCEDURE — 99232 SBSQ HOSP IP/OBS MODERATE 35: CPT

## 2025-02-11 RX ORDER — MAGNESIUM HYDROXIDE 400 MG/5ML
30 SUSPENSION, ORAL (FINAL DOSE FORM) ORAL DAILY
Refills: 0 | Status: DISCONTINUED | OUTPATIENT
Start: 2025-02-11 | End: 2025-02-12

## 2025-02-11 RX ADMIN — Medication 750 MILLIGRAM(S): at 21:57

## 2025-02-11 RX ADMIN — GABAPENTIN 600 MILLIGRAM(S): 800 TABLET ORAL at 06:15

## 2025-02-11 RX ADMIN — CHLORDIAZEPOXIDE HYDROCHLORIDE 50 MILLIGRAM(S): 25 CAPSULE ORAL at 21:57

## 2025-02-11 RX ADMIN — Medication 1 TABLET(S): at 11:50

## 2025-02-11 RX ADMIN — CHLORDIAZEPOXIDE HYDROCHLORIDE 50 MILLIGRAM(S): 25 CAPSULE ORAL at 04:16

## 2025-02-11 RX ADMIN — Medication 750 MILLIGRAM(S): at 15:00

## 2025-02-11 RX ADMIN — LEVOTHYROXINE SODIUM 100 MICROGRAM(S): 25 TABLET ORAL at 06:15

## 2025-02-11 RX ADMIN — ACETAMINOPHEN 1000 MILLIGRAM(S): 160 SUSPENSION ORAL at 12:46

## 2025-02-11 RX ADMIN — ENOXAPARIN SODIUM 40 MILLIGRAM(S): 100 INJECTION SUBCUTANEOUS at 11:49

## 2025-02-11 RX ADMIN — Medication 100 MILLIGRAM(S): at 14:17

## 2025-02-11 RX ADMIN — CHLORDIAZEPOXIDE HYDROCHLORIDE 50 MILLIGRAM(S): 25 CAPSULE ORAL at 11:52

## 2025-02-11 RX ADMIN — GABAPENTIN 600 MILLIGRAM(S): 800 TABLET ORAL at 21:57

## 2025-02-11 RX ADMIN — Medication 100 MILLIGRAM(S): at 21:56

## 2025-02-11 RX ADMIN — POLYETHYLENE GLYCOL 3350 17 GRAM(S): 17 POWDER, FOR SOLUTION ORAL at 11:50

## 2025-02-11 RX ADMIN — ACETAMINOPHEN 1000 MILLIGRAM(S): 160 SUSPENSION ORAL at 21:56

## 2025-02-11 RX ADMIN — Medication 100 MILLIGRAM(S): at 11:49

## 2025-02-11 RX ADMIN — Medication 1 MILLIGRAM(S): at 11:49

## 2025-02-11 RX ADMIN — Medication 100 MILLIGRAM(S): at 06:14

## 2025-02-11 RX ADMIN — OXYCODONE HYDROCHLORIDE 10 MILLIGRAM(S): 30 TABLET ORAL at 17:40

## 2025-02-11 RX ADMIN — ACETAMINOPHEN 1000 MILLIGRAM(S): 160 SUSPENSION ORAL at 06:44

## 2025-02-11 RX ADMIN — Medication 2 TABLET(S): at 21:57

## 2025-02-11 RX ADMIN — Medication 1 TABLET(S): at 17:17

## 2025-02-11 RX ADMIN — GABAPENTIN 600 MILLIGRAM(S): 800 TABLET ORAL at 15:47

## 2025-02-11 RX ADMIN — PANTOPRAZOLE 40 MILLIGRAM(S): 20 TABLET, DELAYED RELEASE ORAL at 06:14

## 2025-02-11 RX ADMIN — CHLORDIAZEPOXIDE HYDROCHLORIDE 50 MILLIGRAM(S): 25 CAPSULE ORAL at 15:00

## 2025-02-11 RX ADMIN — Medication 750 MILLIGRAM(S): at 06:15

## 2025-02-11 RX ADMIN — ACETAMINOPHEN 1000 MILLIGRAM(S): 160 SUSPENSION ORAL at 06:14

## 2025-02-11 NOTE — PROGRESS NOTE ADULT - ASSESSMENT
DARON ORTEGA (74y Male) with PMH of hypothyroidism, TIA, and lumbar spinal stenosis underwent L2-3, L3-4, L4-5, L5-S1 Bilateral laminectomy with closure of spinal surgical wound w/ myocutaneous flap on 2/7/25. He is admitted under neurosurgical service for post-op care. 02/10/25, he started developing tremors and anxiety for which he started on Librium for concerns of alcohol withdrawal.     Hospital medicine is consulted for co-management of medical co-morbidities.       # Lumbar spinal stenosis   # L2-3, L3-4, L4-5, L5-S1 Bilateral laminectomy with closure of spinal surgical wound w/ myocutaneous flap on 2/7/25  - TAL drain removed   - Pain Mx as per primary team     # Bilateral hand tremors   # Alcohol withdrawal   - On 02/10/25: Patient started developing bilateral hand tremors and anxiety.   - Admits to heavy drinking habit in the past which he has cut down to 1-2 beers/day. Last use of alcohol prior to presentation (02/06/25). Denies liver disease. Denies prior episodes of alcohol withdrawal.  - C/w Librium taper (start date 2/10/25)  - Folic acid, thiamine, multi-vitamins  - IV fluids  - Alcohol withdrawal precautions  - Seizure precautions  - Propranolol 10 mg BID -- unclear if he has superimposed component of essential tremors, as he mentioned he has h/o tremors for months in the past.     # Intermittent headache   # Caffeine withdrawal   - Suspect caffeine withdrawal. Patient used to drink 6-7 caffeinated drinks/day. Now he endorsed headache and feeling "groggy" which resolved after drinking coffee.   - Low suspicion of CSF leak as per NSGY  - Can offer Fioricet PRN for headache.     # Leucocytosis - resolved   - Likely reactive in setting of operative procedure.   - WBC 14.85. Recheck CBC tomorrow AM  - Remains afebrile  - Carolin-op Ancef as per NSGY. Consider discontinuing Ancef if no indications.     # Hypothyroidism   - C/w levothyroxine  - TSH 0.25. Send free T3 and free T4      # TIA  - He used to take Aspirin 81 mg but stopped taking it may years ago. PCP is aware.     # Prophylactic measures -- as per primary team   - DVT PPx: Lovenox   - GI PPx: Pantoprazole.   - Bowel regime: miralax and senna   - Diet: DASH/TLC   - Activity: Advance as tolerated     Physician Handoff  - Tremors.      Spent over 35 min reviewing chart, speaking with patient/family and on coordinating patient care during interdisciplinary rounds.

## 2025-02-11 NOTE — PROGRESS NOTE ADULT - ASSESSMENT
74M, POD#0 S/P L2-3, L3-4, L4-5, L5-S1 Bilateral laminectomy with closure of spinal surgical wound w/ myocutaneous flap on 2/7        PLAN:   - monitor TAL drain output  - continue Abx  - multi modal pain control  - Mepilex in place- will remove or change POD5  - Rest of care per primary team

## 2025-02-11 NOTE — PROGRESS NOTE ADULT - SUBJECTIVE AND OBJECTIVE BOX
Plastic Surgery Progress Note (pg [facility]: [id number], [facility]: [phone])    SUBJECTIVE  The patient was seen and examined. Doing well. Pain well-controlled.     OBJECTIVE  ___________________________________________________  VITAL SIGNS / I&O's   Vital Signs Last 24 Hrs  T(C): 36.8 (11 Feb 2025 00:33), Max: 37.2 (10 Feb 2025 16:00)  T(F): 98.2 (11 Feb 2025 00:33), Max: 99 (10 Feb 2025 16:00)  HR: 71 (11 Feb 2025 06:00) (71 - 81)  BP: 119/72 (11 Feb 2025 06:00) (109/74 - 119/72)  BP(mean): --  RR: 18 (11 Feb 2025 00:33) (18 - 18)  SpO2: 96% (11 Feb 2025 00:33) (96% - 97%)          10 Feb 2025 07:01  -  11 Feb 2025 07:00  --------------------------------------------------------  IN:  Total IN: 0 mL    OUT:    Bulb (mL): 22.5 mL    Voided (mL): 200 mL  Total OUT: 222.5 mL    Total NET: -222.5 mL        ___________________________________________________  PHYSICAL EXAM    GENERAL: NAD  CHEST/LUNG: Equal chest rise b/l, non labored breathing   HEART: Regular rate and rhythm  ABDOMEN: Soft, Nontender, Nondistended;   EXTREMITIES:  No clubbing, cyanosis, or edema  BACK: Mepilex dressing in place C/D/I, back soft/flat, TAL drain with serosanguinous output  ___________________________________________________  LABS                        13.1   8.79  )-----------( 254      ( 10 Feb 2025 05:24 )             38.8             CAPILLARY BLOOD GLUCOSE              ___________________________________________________  MICRO  Recent Cultures:    ___________________________________________________  MEDICATIONS  (STANDING):  acetaminophen     Tablet .. 1000 milliGRAM(s) Oral every 8 hours  ceFAZolin   IVPB 1000 milliGRAM(s) IV Intermittent every 8 hours  chlordiazePOXIDE   Oral   chlordiazePOXIDE 50 milliGRAM(s) Oral every 6 hours  chlordiazePOXIDE 50 milliGRAM(s) Oral every 8 hours  enoxaparin Injectable 40 milliGRAM(s) SubCutaneous every 24 hours  folic acid 1 milliGRAM(s) Oral daily  gabapentin 600 milliGRAM(s) Oral three times a day  influenza  Vaccine (HIGH DOSE) 0.5 milliLiter(s) IntraMuscular once  lactobacillus acidophilus 1 Tablet(s) Oral daily  levothyroxine 100 MICROGram(s) Oral daily  methocarbamol 750 milliGRAM(s) Oral every 8 hours  multivitamin 1 Tablet(s) Oral daily  pantoprazole    Tablet 40 milliGRAM(s) Oral before breakfast  polyethylene glycol 3350 17 Gram(s) Oral daily  propranolol 10 milliGRAM(s) Oral two times a day  senna 2 Tablet(s) Oral at bedtime  sodium chloride 0.9%. 1000 milliLiter(s) (75 mL/Hr) IV Continuous <Continuous>  thiamine 100 milliGRAM(s) Oral daily    MEDICATIONS  (PRN):  acetaminophen 300 mG/butalbital 50 mG/ caffeine 40 mG 1 Capsule(s) Oral every 6 hours PRN heaaches  ondansetron Injectable 4 milliGRAM(s) IV Push every 6 hours PRN Nausea and/or Vomiting  oxyCODONE    IR 5 milliGRAM(s) Oral every 4 hours PRN Moderate Pain (4 - 6)  oxyCODONE    IR 10 milliGRAM(s) Oral every 4 hours PRN Severe Pain (7 - 10)

## 2025-02-11 NOTE — PROGRESS NOTE ADULT - SUBJECTIVE AND OBJECTIVE BOX
SUBJECTIVE:   DARON ORTEGA (74y Male) was seen at bedside along with his son. His tremors are improved. No other complaints at the moment.     REVIEW OF SYSTEM:   RESPIRATORY: No cough, wheezing, hemoptysis, or shortness of breath  CARDIOVASCULAR: No chest pain, palpitations, or leg swelling  GASTROINTESTINAL: No abdominal pain, abdominal distension, nausea, vomiting, hematemesis, diarrhea, constipation, melena, or hematochezia.  GENITOURINARY: No dysuria, or flank tenderness   NEUROLOGICAL: Tremors. No headaches, loss of strength, loss of sensation, slurred speech  MUSCULOSKELETAL: No joint pain, joint swelling, back pain    PHYSICAL EXAM:  HEENT: clear conjunctiva pupils bilaterally equal and reactive to light  NERVOUS SYSTEM:  AAOx3. . Moves all extremities. No sensory deficits. No CN deficits.   LUNG: Bilateral clear air entry. No wheezing or crackles.   HEART: S1, S2 audible. No abnormal cardiac murmur.   ABDOMEN: Soft, nontender, nondistended, BS+  EXTREMITIES:  DP pedis pules palpable, no edema, no cyanosis, warm to touch.   SKIN: No rashes, or lesions    Vital Signs Last 24 Hrs  T(C): 36.6 (11 Feb 2025 08:05), Max: 37.2 (10 Feb 2025 16:00)  T(F): 97.8 (11 Feb 2025 08:05), Max: 99 (10 Feb 2025 16:00)  HR: 70 (11 Feb 2025 08:05) (70 - 81)  BP: 105/67 (11 Feb 2025 08:05) (105/67 - 119/72)  BP(mean): --  RR: 18 (11 Feb 2025 08:05) (18 - 18)  SpO2: 97% (11 Feb 2025 08:05) (96% - 97%)      MEDICATIONS  (STANDING):  acetaminophen     Tablet .. 1000 milliGRAM(s) Oral every 8 hours  ceFAZolin   IVPB 1000 milliGRAM(s) IV Intermittent every 8 hours  chlordiazePOXIDE   Oral   chlordiazePOXIDE 50 milliGRAM(s) Oral every 8 hours  enoxaparin Injectable 40 milliGRAM(s) SubCutaneous every 24 hours  folic acid 1 milliGRAM(s) Oral daily  gabapentin 600 milliGRAM(s) Oral three times a day  influenza  Vaccine (HIGH DOSE) 0.5 milliLiter(s) IntraMuscular once  lactobacillus acidophilus 1 Tablet(s) Oral daily  levothyroxine 100 MICROGram(s) Oral daily  methocarbamol 750 milliGRAM(s) Oral every 8 hours  multivitamin 1 Tablet(s) Oral daily  pantoprazole    Tablet 40 milliGRAM(s) Oral before breakfast  polyethylene glycol 3350 17 Gram(s) Oral daily  propranolol 10 milliGRAM(s) Oral two times a day  senna 2 Tablet(s) Oral at bedtime  sodium chloride 0.9%. 1000 milliLiter(s) (75 mL/Hr) IV Continuous <Continuous>  thiamine 100 milliGRAM(s) Oral daily    MEDICATIONS  (PRN):  acetaminophen 300 mG/butalbital 50 mG/ caffeine 40 mG 1 Capsule(s) Oral every 6 hours PRN heaaches  ondansetron Injectable 4 milliGRAM(s) IV Push every 6 hours PRN Nausea and/or Vomiting  oxyCODONE    IR 5 milliGRAM(s) Oral every 4 hours PRN Moderate Pain (4 - 6)  oxyCODONE    IR 10 milliGRAM(s) Oral every 4 hours PRN Severe Pain (7 - 10)

## 2025-02-11 NOTE — PROGRESS NOTE ADULT - SUBJECTIVE AND OBJECTIVE BOX
Neurosurgery Progress Note    POD#4  S/P L2-3, L3-4, L4-5, L5-S1 Bilateral laminectomy with plastics closure    Pt seen and examined at the bedside.  No major over night events.  At present time pt is resting in bed VSS, JPx1 in place.  Pt endorses mild posterior thigh pain relieved with analgesics, Pt is ELLER x4 with good strength, SILT, TAL bulb off suction, surgical dressing dry intact.          Subjective: 74yMale with a pmhx of Lumbar radiculopathy    Handoff    MEWS Score    Hyperlipidemia    Hypothyroidism    Stroke    Chronic back pain    Prostate cancer    Neurogenic claudication due to lumbar spinal stenosis    Neurogenic claudication due to lumbar spinal stenosis    Laminectomy, spine, lumbar, 4 levels, with discectomy    Myocutaneous flap of back    History of hip surgery    [id number]; [id number]    [id number]      s/p     Allergies    contrast media (iodine-based) (Unknown)    Intolerances        Vital Signs Last 24 Hrs  T(C): 36.6 (11 Feb 2025 08:05), Max: 37.2 (10 Feb 2025 16:00)  T(F): 97.8 (11 Feb 2025 08:05), Max: 99 (10 Feb 2025 16:00)  HR: 70 (11 Feb 2025 08:05) (70 - 81)  BP: 105/67 (11 Feb 2025 08:05) (105/67 - 119/72)  BP(mean): --  RR: 18 (11 Feb 2025 08:05) (18 - 18)  SpO2: 97% (11 Feb 2025 08:05) (96% - 97%)      acetaminophen     Tablet .. 1000 milliGRAM(s) Oral every 8 hours  acetaminophen 300 mG/butalbital 50 mG/ caffeine 40 mG 1 Capsule(s) Oral every 6 hours PRN  ceFAZolin   IVPB 1000 milliGRAM(s) IV Intermittent every 8 hours  chlordiazePOXIDE   Oral   chlordiazePOXIDE 50 milliGRAM(s) Oral every 6 hours  chlordiazePOXIDE 50 milliGRAM(s) Oral every 8 hours  enoxaparin Injectable 40 milliGRAM(s) SubCutaneous every 24 hours  folic acid 1 milliGRAM(s) Oral daily  gabapentin 600 milliGRAM(s) Oral three times a day  influenza  Vaccine (HIGH DOSE) 0.5 milliLiter(s) IntraMuscular once  lactobacillus acidophilus 1 Tablet(s) Oral daily  levothyroxine 100 MICROGram(s) Oral daily  methocarbamol 750 milliGRAM(s) Oral every 8 hours  multivitamin 1 Tablet(s) Oral daily  ondansetron Injectable 4 milliGRAM(s) IV Push every 6 hours PRN  oxyCODONE    IR 5 milliGRAM(s) Oral every 4 hours PRN  oxyCODONE    IR 10 milliGRAM(s) Oral every 4 hours PRN  pantoprazole    Tablet 40 milliGRAM(s) Oral before breakfast  polyethylene glycol 3350 17 Gram(s) Oral daily  propranolol 10 milliGRAM(s) Oral two times a day  senna 2 Tablet(s) Oral at bedtime  sodium chloride 0.9%. 1000 milliLiter(s) IV Continuous <Continuous>  thiamine 100 milliGRAM(s) Oral daily        02-10-25 @ 07:01  -  02-11-25 @ 07:00  --------------------------------------------------------  IN: 0 mL / OUT: 222.5 mL / NET: -222.5 mL        REVIEW OF SYSTEMS    [ ] A ten-point review of systems was otherwise negative except as noted.  [ ] Due to altered mental status/intubation, subjective information were not able to be obtained from the patient. History was obtained, to the extent possible, from review of the chart and collateral sources of information.      Exam:  AAOX3. Verbal function intact  Motor: MAEx4, 5/5 power in b/l UE  5/5 b/l LE  b/l df pf 5/5  Sensation: SILT  surgical site: surgical dressing, Mepilex in place, dry intact  TAL drain off suction, OP: 22cc          CBC Full  -  ( 10 Feb 2025 05:24 )  WBC Count : 8.79 K/uL  RBC Count : 4.28 M/uL  Hemoglobin : 13.1 g/dL  Hematocrit : 38.8 %  Platelet Count - Automated : 254 K/uL  Mean Cell Volume : 90.7 fL  Mean Cell Hemoglobin : 30.6 pg  Mean Cell Hemoglobin Concentration : 33.8 g/dL  Auto Neutrophil # : x  Auto Lymphocyte # : x  Auto Monocyte # : x  Auto Eosinophil # : x  Auto Basophil # : x  Auto Neutrophil % : x  Auto Lymphocyte % : x  Auto Monocyte % : x  Auto Eosinophil % : x  Auto Basophil % : x                  Wound:    Imaging:    Assessment/Plan:   76M POD#4, S/P L2-3, L3-4, L4-5, L5-S1 Bilateral laminectomy with plastics closure    Pain control Tylenol 1g q8hrs, gabapentin 600mg TID, Robaxin 750mg q8hrs, oxycodone PRN  PO diet, bowel regimine  Surgical site/TAL drain per Plastic team; recs appreciated Mepilex off POD5  PT/OT rehab, encourage IS  DVT ppx [facility]  Hospitalist comanagement

## 2025-02-12 LAB
ALBUMIN SERPL ELPH-MCNC: 3.5 G/DL — SIGNIFICANT CHANGE UP (ref 3.5–5.2)
ALP SERPL-CCNC: 98 U/L — SIGNIFICANT CHANGE UP (ref 30–115)
ALT FLD-CCNC: 36 U/L — SIGNIFICANT CHANGE UP (ref 0–41)
AMMONIA BLD-MCNC: 14 UMOL/L — SIGNIFICANT CHANGE UP (ref 11–55)
ANION GAP SERPL CALC-SCNC: 6 MMOL/L — LOW (ref 7–14)
AST SERPL-CCNC: 68 U/L — HIGH (ref 0–41)
BILIRUB SERPL-MCNC: 0.4 MG/DL — SIGNIFICANT CHANGE UP (ref 0.2–1.2)
BUN SERPL-MCNC: 10 MG/DL — SIGNIFICANT CHANGE UP (ref 10–20)
CALCIUM SERPL-MCNC: 9.1 MG/DL — SIGNIFICANT CHANGE UP (ref 8.4–10.4)
CHLORIDE SERPL-SCNC: 103 MMOL/L — SIGNIFICANT CHANGE UP (ref 98–110)
CO2 SERPL-SCNC: 30 MMOL/L — SIGNIFICANT CHANGE UP (ref 17–32)
CREAT SERPL-MCNC: 0.8 MG/DL — SIGNIFICANT CHANGE UP (ref 0.7–1.5)
EGFR: 94 ML/MIN/1.73M2 — SIGNIFICANT CHANGE UP
GLUCOSE BLDC GLUCOMTR-MCNC: 102 MG/DL — HIGH (ref 70–99)
GLUCOSE SERPL-MCNC: 112 MG/DL — HIGH (ref 70–99)
HCT VFR BLD CALC: 40.8 % — LOW (ref 42–52)
HGB BLD-MCNC: 13.8 G/DL — LOW (ref 14–18)
MCHC RBC-ENTMCNC: 30.9 PG — SIGNIFICANT CHANGE UP (ref 27–31)
MCHC RBC-ENTMCNC: 33.8 G/DL — SIGNIFICANT CHANGE UP (ref 32–37)
MCV RBC AUTO: 91.5 FL — SIGNIFICANT CHANGE UP (ref 80–94)
NRBC BLD AUTO-RTO: 0 /100 WBCS — SIGNIFICANT CHANGE UP (ref 0–0)
PLATELET # BLD AUTO: 294 K/UL — SIGNIFICANT CHANGE UP (ref 130–400)
PMV BLD: 9.7 FL — SIGNIFICANT CHANGE UP (ref 7.4–10.4)
POTASSIUM SERPL-MCNC: 4.6 MMOL/L — SIGNIFICANT CHANGE UP (ref 3.5–5)
POTASSIUM SERPL-SCNC: 4.6 MMOL/L — SIGNIFICANT CHANGE UP (ref 3.5–5)
PROT SERPL-MCNC: 6.1 G/DL — SIGNIFICANT CHANGE UP (ref 6–8)
RBC # BLD: 4.46 M/UL — LOW (ref 4.7–6.1)
RBC # FLD: 12.9 % — SIGNIFICANT CHANGE UP (ref 11.5–14.5)
SODIUM SERPL-SCNC: 139 MMOL/L — SIGNIFICANT CHANGE UP (ref 135–146)
T3FREE SERPL-MCNC: 1.93 PG/ML — LOW (ref 2–4.4)
T4 FREE SERPL-MCNC: 1.7 NG/DL — SIGNIFICANT CHANGE UP (ref 0.9–1.7)
WBC # BLD: 6.9 K/UL — SIGNIFICANT CHANGE UP (ref 4.8–10.8)
WBC # FLD AUTO: 6.9 K/UL — SIGNIFICANT CHANGE UP (ref 4.8–10.8)

## 2025-02-12 PROCEDURE — 99232 SBSQ HOSP IP/OBS MODERATE 35: CPT

## 2025-02-12 PROCEDURE — 99222 1ST HOSP IP/OBS MODERATE 55: CPT

## 2025-02-12 RX ORDER — MAGNESIUM HYDROXIDE 400 MG/5ML
30 SUSPENSION, ORAL (FINAL DOSE FORM) ORAL
Refills: 0 | Status: DISCONTINUED | OUTPATIENT
Start: 2025-02-12 | End: 2025-02-13

## 2025-02-12 RX ORDER — BISACODYL 5 MG
10 TABLET, DELAYED RELEASE (ENTERIC COATED) ORAL DAILY
Refills: 0 | Status: DISCONTINUED | OUTPATIENT
Start: 2025-02-12 | End: 2025-02-13

## 2025-02-12 RX ORDER — POLYETHYLENE GLYCOL 3350 17 G/17G
17 POWDER, FOR SOLUTION ORAL
Refills: 0 | Status: DISCONTINUED | OUTPATIENT
Start: 2025-02-12 | End: 2025-02-13

## 2025-02-12 RX ADMIN — POLYETHYLENE GLYCOL 3350 17 GRAM(S): 17 POWDER, FOR SOLUTION ORAL at 17:14

## 2025-02-12 RX ADMIN — GABAPENTIN 600 MILLIGRAM(S): 800 TABLET ORAL at 06:00

## 2025-02-12 RX ADMIN — ACETAMINOPHEN 1000 MILLIGRAM(S): 160 SUSPENSION ORAL at 16:32

## 2025-02-12 RX ADMIN — GABAPENTIN 600 MILLIGRAM(S): 800 TABLET ORAL at 21:45

## 2025-02-12 RX ADMIN — Medication 30 MILLILITER(S): at 17:13

## 2025-02-12 RX ADMIN — Medication 30 MILLILITER(S): at 11:03

## 2025-02-12 RX ADMIN — Medication 100 MILLIGRAM(S): at 11:03

## 2025-02-12 RX ADMIN — ACETAMINOPHEN 1000 MILLIGRAM(S): 160 SUSPENSION ORAL at 21:45

## 2025-02-12 RX ADMIN — Medication 750 MILLIGRAM(S): at 21:45

## 2025-02-12 RX ADMIN — ENOXAPARIN SODIUM 40 MILLIGRAM(S): 100 INJECTION SUBCUTANEOUS at 11:04

## 2025-02-12 RX ADMIN — Medication 1 TABLET(S): at 11:03

## 2025-02-12 RX ADMIN — LEVOTHYROXINE SODIUM 100 MICROGRAM(S): 25 TABLET ORAL at 06:00

## 2025-02-12 RX ADMIN — ACETAMINOPHEN 1000 MILLIGRAM(S): 160 SUSPENSION ORAL at 15:03

## 2025-02-12 RX ADMIN — Medication 10 MILLIGRAM(S): at 11:04

## 2025-02-12 RX ADMIN — Medication 100 MILLIGRAM(S): at 05:59

## 2025-02-12 RX ADMIN — Medication 750 MILLIGRAM(S): at 06:00

## 2025-02-12 RX ADMIN — ACETAMINOPHEN 1000 MILLIGRAM(S): 160 SUSPENSION ORAL at 05:59

## 2025-02-12 RX ADMIN — CHLORDIAZEPOXIDE HYDROCHLORIDE 50 MILLIGRAM(S): 25 CAPSULE ORAL at 06:00

## 2025-02-12 RX ADMIN — Medication 1 MILLIGRAM(S): at 11:03

## 2025-02-12 RX ADMIN — Medication 750 MILLIGRAM(S): at 15:04

## 2025-02-12 RX ADMIN — PANTOPRAZOLE 40 MILLIGRAM(S): 20 TABLET, DELAYED RELEASE ORAL at 06:00

## 2025-02-12 RX ADMIN — Medication 2 TABLET(S): at 21:45

## 2025-02-12 RX ADMIN — POLYETHYLENE GLYCOL 3350 17 GRAM(S): 17 POWDER, FOR SOLUTION ORAL at 11:04

## 2025-02-12 RX ADMIN — GABAPENTIN 600 MILLIGRAM(S): 800 TABLET ORAL at 15:03

## 2025-02-12 NOTE — CONSULT NOTE ADULT - SUBJECTIVE AND OBJECTIVE BOX
HPI: 75 yo M  reports pain in the left lower leg and reports this pain as radiating, pins and needles sensation, numbness down the left leg. The patient reports aches and soreness in the cervical region of spine and down to lumbar region of spine. Reports that pain radiates to the left hip. The patient states that the pain began approximately one year ago, and states that the pain progressively worsened with time.  Reports difficulties getting out of bed over the last week due to pain in the neck and back. Rates the pain a 10/10 on the pain scale, intermittent. The patient reports that activity worsens pain and pain medication as well rest relieves discomfort.     Pt admitted to [facility] on 2/7/25 for lumbar stenosis and underwent s/p L2-3, L3-4, L4-5, L5-S1 bilateral laminectomy, foraminotomy, discectomy and Closure of spinal surgical wound with myocutaneous flap.      PAST MEDICAL & SURGICAL HISTORY:  Hyperlipidemia      Hypothyroidism      Stroke      Chronic back pain      Prostate cancer      History of hip surgery          Hospital Course:    TODAY'S SUBJECTIVE & REVIEW OF SYMPTOMS:     Constitutional WNL   Cardio WNL   Resp WNL   GI WNL  Heme WNL  Endo WNL  Skin WNL  MSK back pain   Neuro WNL  Cognitive WNL  Psych WNL      MEDICATIONS  (STANDING):  acetaminophen     Tablet .. 1000 milliGRAM(s) Oral every 8 hours  ceFAZolin   IVPB 1000 milliGRAM(s) IV Intermittent every 8 hours  gabapentin 600 milliGRAM(s) Oral three times a day  levothyroxine 100 MICROGram(s) Oral daily  methocarbamol 750 milliGRAM(s) Oral every 8 hours  pantoprazole    Tablet 40 milliGRAM(s) Oral before breakfast  polyethylene glycol 3350 17 Gram(s) Oral daily  senna 2 Tablet(s) Oral at bedtime  sodium chloride 0.9%. 1000 milliLiter(s) (75 mL/Hr) IV Continuous <Continuous>    MEDICATIONS  (PRN):  HYDROmorphone  Injectable 0.5 milliGRAM(s) IV Push every 10 minutes PRN Moderate Pain (4 - 6)  HYDROmorphone  Injectable 1 milliGRAM(s) IV Push every 10 minutes PRN Severe Pain (7 - 10)  ondansetron Injectable 4 milliGRAM(s) IV Push every 6 hours PRN Nausea and/or Vomiting  ondansetron Injectable 4 milliGRAM(s) IV Push once PRN Nausea and/or Vomiting  oxyCODONE    IR 5 milliGRAM(s) Oral every 4 hours PRN Moderate Pain (4 - 6)  oxyCODONE    IR 10 milliGRAM(s) Oral every 4 hours PRN Severe Pain (7 - 10)      FAMILY HISTORY:      Allergies    contrast media (iodine-based) (Unknown)    Intolerances        SOCIAL HISTORY:    [  ] Etoh  [  ] Smoking  [  ] Substance abuse     Home Environment:  [   ] Home Alone  [ x  ] Lives with Family  [   ] Home Health Aid    Dwelling:  [   ] Apartment  [ x  ] Private House  [   ] Adult Home  [   ] Skilled Nursing Facility      [   ] Short Term  [   ] Long Term  [ x  ] Stairs       Elevator [   ]    FUNCTIONAL STATUS PTA: (Check all that apply)  Ambulation: [ x   ]Independent    [   ] Dependent     [   ] Non-Ambulatory  Assistive Device: [   ] SA Cane  [   ]  Q Cane  [   ] Walker  [   ]  Wheelchair  ADL : [ x  ] Independent  [    ]  Dependent       Vital Signs Last 24 Hrs  T(C): 36.8 (07 Feb 2025 16:11), Max: 36.8 (07 Feb 2025 16:11)  T(F): 98.2 (07 Feb 2025 16:11), Max: 98.2 (07 Feb 2025 16:11)  HR: 89 (07 Feb 2025 17:00) (71 - 93)  BP: 132/76 (07 Feb 2025 17:00) (120/75 - 165/80)  BP(mean): --  RR: 22 (07 Feb 2025 17:00) (12 - 22)  SpO2: 99% (07 Feb 2025 17:00) (93% - 100%)    Parameters below as of 07 Feb 2025 17:00  Patient On (Oxygen Delivery Method): nasal cannula  O2 Flow (L/min): 3        PHYSICAL EXAM: Awake & Alert  GENERAL: NAD  HEAD:  Normocephalic  CHEST/LUNG: Clear   HEART: S1S2+  ABDOMEN: Soft, Nontender  EXTREMITIES:  no calf tenderness    NERVOUS SYSTEM:  Cranial Nerves 2-12 intact [   ] Abnormal  [   ]  ROM: WFL all extremities [  x ]  Abnormal [   ]  Motor Strength: WFL all extremities  [  x ]  Abnormal [   ]  Sensation: intact to light touch [ x  ] Abnormal [   ]    FUNCTIONAL STATUS:  Bed Mobility: Independent [   ]  Supervision [   ]  Needs Assistance [  x ]  N/A [   ]  Transfers: Independent [   ]  Supervision [   ]  Needs Assistance [   ]  N/A [   ]   Ambulation: Independent [   ]  Supervision [   ]  Needs Assistance [   ]  N/A [   ]  ADL: Independent [   ] Requires Assistance [   ] N/A [   ]      LABS:                        14.8   14.85 )-----------( 320      ( 07 Feb 2025 16:34 )             43.5     02-07    137  |  103  |  10  ----------------------------<  132[H]  4.2   |  25  |  0.9    Ca    9.1      07 Feb 2025 16:34    TPro  6.3  /  Alb  4.1  /  TBili  0.5  /  DBili  x   /  AST  21  /  ALT  20  /  AlkPhos  81  02-07      Urinalysis Basic - ( 07 Feb 2025 16:34 )    Color: x / Appearance: x / SG: x / pH: x  Gluc: 132 mg/dL / Ketone: x  / Bili: x / Urobili: x   Blood: x / Protein: x / Nitrite: x   Leuk Esterase: x / RBC: x / WBC x   Sq Epi: x / Non Sq Epi: x / Bacteria: x        RADIOLOGY & ADDITIONAL STUDIES:  
  Pt interviewed, examined and EMR chart reviewed.    75 yo domciled Male with hx of Chronic back pain, prostate ca in remission, hx of CVA, hypothyroidism and HLD and no signficant PPHx with no previous IPP hospitalizations, SA or NSSIB presenting to the hospital for lumbar stenosis s/p L2-3, L3-4, L4-5, L5-S1 bilateral laminectomy, foraminotomy, discectomy and closure. Addiction Medicine consulted for concern for AUD and alcohol withdrawal.     Patient was seen this afternoon lying in bed in no acute distress, AAOx3. Patient states that when he was in his 40s, he drank heavily, 5-6 beers and 5-6 mixed cocktails almost daily until he reached 52 years old. Patient states he had a significant period of sobriety after age 52 to about 67 years old, and only restarted drinking one beer, [place] 11 oz bottle, about 4-5 times a week, not everyday. Patient acknowledges that on days that he does not drink, he does not have any cravings or symptoms of withdrawal and does not find that drinking one beer causes him any issues at home, interpersonally or with any obligations. Denies any history of seizures or hospitalizations 2/2 to alcohol. Patient states that he previously used to smoke a pack a day but quit at age 43. When he was in his teens/20s patient notes smoking cannabis on occasion but has not used cannabis in many decades. Patient otherwise denies any benzodiazepines, cocaine and other amphetamines, cannabinoids or any illicit substance use.     Patient drinks 6-7 cups of espresso a day and notes that initially during admission, he has significant caffeine withdrawal related migraines. He notes improvement in these symptoms but has a mild lingering headache today which he will treat with drinking some more coffee.    Patient denies increased anxiety, depression, SI/HI, AH/VH. Patient admits to a mild headache which he states is from caffeine withdrawal, denies N/V, diaphoresis, tactile disturbances, AH/ VH, anxiety, SI or HI. 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 States that his grandmother had a similar tremor. CIWA score 2 for headache given that tremor is a confounder from history.     [facility] ISTOP:   This report was requested by: Corina Raymond | Reference #: [id number]  There are no results for the search terms that you entered.     SOCIAL HISTORY:  lives with wife  has two children   previously worked as an  on [address], retired    REVIEW OF SYSTEMS: per HPI     MEDICATIONS  (STANDING):  acetaminophen     Tablet .. 1000 milliGRAM(s) Oral every 8 hours  bisacodyl Suppository 10 milliGRAM(s) Rectal daily  enoxaparin Injectable 40 milliGRAM(s) SubCutaneous every 24 hours  folic acid 1 milliGRAM(s) Oral daily  gabapentin 600 milliGRAM(s) Oral three times a day  influenza  Vaccine (HIGH DOSE) 0.5 milliLiter(s) IntraMuscular once  lactobacillus acidophilus 1 Tablet(s) Oral daily  levothyroxine 100 MICROGram(s) Oral daily  magnesium hydroxide Suspension 30 milliLiter(s) Oral two times a day  methocarbamol 750 milliGRAM(s) Oral every 8 hours  multivitamin 1 Tablet(s) Oral daily  pantoprazole    Tablet 40 milliGRAM(s) Oral before breakfast  polyethylene glycol 3350 17 Gram(s) Oral two times a day  senna 2 Tablet(s) Oral at bedtime  thiamine 100 milliGRAM(s) Oral daily    MEDICATIONS  (PRN):  acetaminophen 300 mG/butalbital 50 mG/ caffeine 40 mG 1 Capsule(s) Oral every 6 hours PRN heaaches  ondansetron Injectable 4 milliGRAM(s) IV Push every 6 hours PRN Nausea and/or Vomiting  oxyCODONE    IR 5 milliGRAM(s) Oral every 4 hours PRN Moderate Pain (4 - 6)  oxyCODONE    IR 10 milliGRAM(s) Oral every 4 hours PRN Severe Pain (7 - 10)      Vital Signs Last 24 Hrs  T(C): 37 (12 Feb 2025 16:21), Max: 37.1 (12 Feb 2025 00:29)  T(F): 98.6 (12 Feb 2025 16:21), Max: 98.7 (12 Feb 2025 00:29)  HR: 74 (12 Feb 2025 16:21) (62 - 78)  BP: 110/67 (12 Feb 2025 16:21) (100/63 - 115/72)  BP(mean): --  RR: 18 (12 Feb 2025 16:21) (18 - 18)  SpO2: 98% (12 Feb 2025 16:21) (96% - 98%)        PHYSICAL EXAM:    Constitutional: NAD, well-groomed, well-developed  HEENT: PERRLA, EOMI  Respiratory: no increased work of breathing  Neurological: A/O x 3, no focal deficits. B/l UE tremor with movement     MENTAL STATUS EXAM:  Appearance: calm, in hospital attire   Appearance in relation to age: looks stated age      Hygiene/Grooming: fair grooming   Attitude toward examiner: fully cooperative  Alertness: alert  Orientation: oriented to time, place and person  Posture: lying in bed  Gait: not evaluated  Behavior and psychomotor activity: normal  Mood: euthymic  Affect: full range and reactivity      Speech: fluent and spontaneous; normal rate, rhythm, volume and tone   Perceptions: denies hallucinations  Thought process: logical, linear and goal-directed    Thought content: no delusions or particular preoccupation, denies SI/HI  Impulse Control: aware of socially acceptable behavior  Insight: good    LABS:                        13.8   6.90  )-----------( 294      ( 12 Feb 2025 12:15 )             40.8     02-12    139  |  103  |  10  ----------------------------<  112[H]  4.6   |  30  |  0.8    Ca    9.1      12 Feb 2025 12:15    TPro  6.1  /  Alb  3.5  /  TBili  0.4  /  DBili  x   /  AST  68[H]  /  ALT  36  /  AlkPhos  98  02-12      Urinalysis Basic - ( 12 Feb 2025 12:15 )    Color: x / Appearance: x / SG: x / pH: x  Gluc: 112 mg/dL / Ketone: x  / Bili: x / Urobili: x   Blood: x / Protein: x / Nitrite: x   Leuk Esterase: x / RBC: x / WBC x   Sq Epi: x / Non Sq Epi: x / Bacteria: x      Drug Screen Urine: none  Alcohol Level none        RADIOLOGY & ADDITIONAL STUDIES:  none

## 2025-02-12 NOTE — PROGRESS NOTE ADULT - ASSESSMENT
DARON ORTEGA (74y Male) with PMH of hypothyroidism, TIA, and lumbar spinal stenosis underwent L2-3, L3-4, L4-5, L5-S1 Bilateral laminectomy with closure of spinal surgical wound w/ myocutaneous flap on 2/7/25. He is admitted under neurosurgical service for post-op care. 02/10/25, he started developing tremors and anxiety for which he started on Librium for concerns of alcohol withdrawal.     Hospital medicine is consulted for co-management of medical co-morbidities.       # Lumbar spinal stenosis   # L2-3, L3-4, L4-5, L5-S1 Bilateral laminectomy with closure of spinal surgical wound w/ myocutaneous flap on 2/7/25  - TAL drain removed   - Pain Mx as per primary team     # Bilateral hand tremors   # Alcohol withdrawal   - f/u Additional Med and CATCH team recs  - Folic acid, thiamine, multi-vitamins  - IV fluids  - Alcohol withdrawal precautions  - Seizure precautions  - Propranolol 10 mg BID -- unclear if he has superimposed component of essential tremors, as he mentioned he has h/o tremors for months in the past.     # Intermittent headache   # Caffeine withdrawal   - Suspect caffeine withdrawal. Patient used to drink 6-7 caffeinated drinks/day. Now he endorsed headache and feeling "groggy" which resolved after drinking coffee.   - Low suspicion of CSF leak as per NSGY  - Can offer Fioricet PRN for headache.     # Leucocytosis - resolved   - Likely reactive in setting of operative procedure.   - WBC 14.85. Recheck CBC tomorrow AM  - Remains afebrile  - Carolin-op Ancef as per NSGY. Consider discontinuing Ancef if no indications.     # Hypothyroidism   - C/w levothyroxine  - TSH 0.25, free T4 WNL    # TIA  - He used to take Aspirin 81 mg but stopped taking it may years ago. PCP is aware.     # Prophylactic measures -- as per primary team   - DVT PPx: Lovenox   - GI PPx: Pantoprazole.   - Bowel regime: miralax and senna   - Diet: DASH/TLC   - Activity: Advance as tolerated     Physician Handoff  - Tremors.      Spent over 35 min reviewing chart, speaking with patient/family and on coordinating patient care during interdisciplinary rounds.

## 2025-02-12 NOTE — PROGRESS NOTE ADULT - SUBJECTIVE AND OBJECTIVE BOX
Subjective: 74yMale with a pmhx of Lumbar radiculopathy    Handoff    MEWS Score    Hyperlipidemia    Hypothyroidism    Stroke    Chronic back pain    Prostate cancer    Neurogenic claudication due to lumbar spinal stenosis    Neurogenic claudication due to lumbar spinal stenosis    Laminectomy, spine, lumbar, 4 levels, with discectomy    Myocutaneous flap of back    History of hip surgery    [id number]; [id number]    [id number]        POD#5  S/P L2-3, L3-4, L4-5, L5-S1 Bilateral laminectomy with plastics closure    Pt seen and examined at the bedside. Pt sleeping but arousable. Pt denies BM yet. Offers no new complaints. Dressing with Mepilex off in the bed upon exam. Plastics resident notified.      Allergies    contrast media (iodine-based) (Unknown)    Intolerances        Imaging:      Vital Signs Last 24 Hrs  T(C): 36.4 (12 Feb 2025 08:19), Max: 37.4 (11 Feb 2025 16:09)  T(F): 97.6 (12 Feb 2025 08:19), Max: 99.4 (11 Feb 2025 16:09)  HR: 62 (12 Feb 2025 08:19) (62 - 82)  BP: 100/63 (12 Feb 2025 08:19) (100/63 - 131/82)  BP(mean): --  RR: 18 (12 Feb 2025 08:19) (18 - 18)  SpO2: 96% (12 Feb 2025 08:19) (96% - 98%)      acetaminophen     Tablet .. 1000 milliGRAM(s) Oral every 8 hours  acetaminophen 300 mG/butalbital 50 mG/ caffeine 40 mG 1 Capsule(s) Oral every 6 hours PRN  bisacodyl Suppository 10 milliGRAM(s) Rectal daily  ceFAZolin   IVPB 1000 milliGRAM(s) IV Intermittent every 8 hours  chlordiazePOXIDE   Oral   chlordiazePOXIDE 50 milliGRAM(s) Oral every 12 hours  enoxaparin Injectable 40 milliGRAM(s) SubCutaneous every 24 hours  folic acid 1 milliGRAM(s) Oral daily  gabapentin 600 milliGRAM(s) Oral three times a day  influenza  Vaccine (HIGH DOSE) 0.5 milliLiter(s) IntraMuscular once  lactobacillus acidophilus 1 Tablet(s) Oral daily  levothyroxine 100 MICROGram(s) Oral daily  magnesium hydroxide Suspension 30 milliLiter(s) Oral daily  methocarbamol 750 milliGRAM(s) Oral every 8 hours  multivitamin 1 Tablet(s) Oral daily  ondansetron Injectable 4 milliGRAM(s) IV Push every 6 hours PRN  oxyCODONE    IR 5 milliGRAM(s) Oral every 4 hours PRN  oxyCODONE    IR 10 milliGRAM(s) Oral every 4 hours PRN  pantoprazole    Tablet 40 milliGRAM(s) Oral before breakfast  polyethylene glycol 3350 17 Gram(s) Oral daily  propranolol 10 milliGRAM(s) Oral two times a day  senna 2 Tablet(s) Oral at bedtime  sodium chloride 0.9%. 1000 milliLiter(s) IV Continuous <Continuous>  thiamine 100 milliGRAM(s) Oral daily        02-11-25 @ 07:01  -  02-12-25 @ 07:00  --------------------------------------------------------  IN: 0 mL / OUT: 575 mL / NET: -575 mL    02-12-25 @ 07:01  -  02-12-25 @ 10:01  --------------------------------------------------------  IN: 225 mL / OUT: 0 mL / NET: 225 mL        REVIEW OF SYSTEMS    [ x] A ten-point review of systems was otherwise negative except as noted.  [ ] Due to altered mental status/intubation, subjective information were not able to be obtained from the patient. History was obtained, to the extent possible, from review of the chart and collateral sources of information.      Neuro Exam:  AAOX3. Verbal function intact  follows commands  Motor: MAEx4, 5/5 power in b/l UE   5/5 power in b/l LE  Sensation: intact to touch in all extremities      Wound: incision intact with tape covering Outer plastics dsg was crumpled off in the bed.         11 Feb 2025 07:01  -  12 Feb 2025 07:00  --------------------------------------------------------  IN:  Total IN: 0 mL    OUT:    Voided (mL): 575 mL  Total OUT: 575 mL    Total NET: -575 mL      12 Feb 2025 07:01  -  12 Feb 2025 10:01  --------------------------------------------------------  IN:    sodium chloride 0.9%: 225 mL  Total IN: 225 mL    OUT:  Total OUT: 0 mL    Total NET: 225 mL        I&O's Summary    11 Feb 2025 07:01  -  12 Feb 2025 07:00  --------------------------------------------------------  IN: 0 mL / OUT: 575 mL / NET: -575 mL    12 Feb 2025 07:01  -  12 Feb 2025 10:01  --------------------------------------------------------  IN: 225 mL / OUT: 0 mL / NET: 225 mL          Assessment/Plan:   Case d/w Hospitalist today  Will get CATCH team consult  Addiction med consult to advise re: ?completing Librium prior to d/c  Cont Propanolol q12 and Ativan PRN  Add Dulcolax supp for BM today  d/w Attg

## 2025-02-12 NOTE — CONSULT NOTE ADULT - TIME BILLING
Chart review of current notes, labs, imaging and past admission documentation, counseling, patient education, coordination of care and documentation of treatment plan.

## 2025-02-12 NOTE — PROGRESS NOTE ADULT - SUBJECTIVE AND OBJECTIVE BOX
SUBJECTIVE:   DARON ORTEGA (74y Male) was seen at bedside along with his son. His tremors are improved. No other complaints at the moment.     REVIEW OF SYSTEM:   RESPIRATORY: No cough, wheezing, hemoptysis, or shortness of breath  CARDIOVASCULAR: No chest pain, palpitations, or leg swelling  GASTROINTESTINAL: No abdominal pain, abdominal distension, nausea, vomiting, hematemesis, diarrhea, constipation, melena, or hematochezia.  GENITOURINARY: No dysuria, or flank tenderness   NEUROLOGICAL: Tremors. No headaches, loss of strength, loss of sensation, slurred speech  MUSCULOSKELETAL: No joint pain, joint swelling, back pain    PHYSICAL EXAM:  HEENT: clear conjunctiva pupils bilaterally equal and reactive to light  NERVOUS SYSTEM:  AAOx3. . Moves all extremities. No sensory deficits. No CN deficits.   LUNG: Bilateral clear air entry. No wheezing or crackles.   HEART: S1, S2 audible. No abnormal cardiac murmur.   ABDOMEN: Soft, nontender, nondistended, BS+  EXTREMITIES:  DP pedis pules palpable, no edema, no cyanosis, warm to touch.   SKIN: No rashes, or lesions    Vital Signs Last 24 Hrs  T(C): 37 (12 Feb 2025 16:21), Max: 37.1 (12 Feb 2025 00:29)  T(F): 98.6 (12 Feb 2025 16:21), Max: 98.7 (12 Feb 2025 00:29)  HR: 74 (12 Feb 2025 16:21) (62 - 78)  BP: 110/67 (12 Feb 2025 16:21) (100/63 - 115/72)  BP(mean): --  RR: 18 (12 Feb 2025 16:21) (18 - 18)  SpO2: 98% (12 Feb 2025 16:21) (96% - 98%)                          13.8   6.90  )-----------( 294      ( 12 Feb 2025 12:15 )             40.8     02-12    139  |  103  |  10  ----------------------------<  112  4.6   |  30  |  0.8    Ca    9.1      12 Feb 2025 12:15    TPro  6.1  /  Alb  3.5  /  TBili  0.4  /  DBili  x   /  AST  68  /  ALT  36  /  AlkPhos  98  02-12    eGFR: 94 mL/min/1.73m2 (12 Feb 2025 12:15)        MEDICATIONS  (STANDING):  acetaminophen     Tablet .. 1000 milliGRAM(s) Oral every 8 hours  ceFAZolin   IVPB 1000 milliGRAM(s) IV Intermittent every 8 hours  chlordiazePOXIDE   Oral   chlordiazePOXIDE 50 milliGRAM(s) Oral every 8 hours  enoxaparin Injectable 40 milliGRAM(s) SubCutaneous every 24 hours  folic acid 1 milliGRAM(s) Oral daily  gabapentin 600 milliGRAM(s) Oral three times a day  influenza  Vaccine (HIGH DOSE) 0.5 milliLiter(s) IntraMuscular once  lactobacillus acidophilus 1 Tablet(s) Oral daily  levothyroxine 100 MICROGram(s) Oral daily  methocarbamol 750 milliGRAM(s) Oral every 8 hours  multivitamin 1 Tablet(s) Oral daily  pantoprazole    Tablet 40 milliGRAM(s) Oral before breakfast  polyethylene glycol 3350 17 Gram(s) Oral daily  propranolol 10 milliGRAM(s) Oral two times a day  senna 2 Tablet(s) Oral at bedtime  sodium chloride 0.9%. 1000 milliLiter(s) (75 mL/Hr) IV Continuous <Continuous>  thiamine 100 milliGRAM(s) Oral daily    MEDICATIONS  (PRN):  acetaminophen 300 mG/butalbital 50 mG/ caffeine 40 mG 1 Capsule(s) Oral every 6 hours PRN heaaches  ondansetron Injectable 4 milliGRAM(s) IV Push every 6 hours PRN Nausea and/or Vomiting  oxyCODONE    IR 5 milliGRAM(s) Oral every 4 hours PRN Moderate Pain (4 - 6)  oxyCODONE    IR 10 milliGRAM(s) Oral every 4 hours PRN Severe Pain (7 - 10)

## 2025-02-12 NOTE — PROGRESS NOTE ADULT - ASSESSMENT
74M, POD#0 S/P L2-3, L3-4, L4-5, L5-S1 Bilateral laminectomy with closure of spinal surgical wound w/ myocutaneous flap on 2/7        PLAN:   - monitor TAL drain output  - continue Abx  - multi modal pain control  - Mepilex removed  - Rest of care per primary team

## 2025-02-12 NOTE — PROGRESS NOTE ADULT - SUBJECTIVE AND OBJECTIVE BOX
Plastic Surgery Progress Note (pg [facility]: [id number], [facility]: [phone])    SUBJECTIVE  The patient was seen and examined. Denies pain fevers or chills    OBJECTIVE  ___________________________________________________  VITAL SIGNS / I&O's   Vital Signs Last 24 Hrs  T(C): 36.4 (12 Feb 2025 08:19), Max: 37.4 (11 Feb 2025 16:09)  T(F): 97.6 (12 Feb 2025 08:19), Max: 99.4 (11 Feb 2025 16:09)  HR: 62 (12 Feb 2025 08:19) (62 - 82)  BP: 100/63 (12 Feb 2025 08:19) (100/63 - 131/82)  BP(mean): --  RR: 18 (12 Feb 2025 08:19) (18 - 18)  SpO2: 96% (12 Feb 2025 08:19) (96% - 98%)          11 Feb 2025 07:01  -  12 Feb 2025 07:00  --------------------------------------------------------  IN:  Total IN: 0 mL    OUT:    Voided (mL): 575 mL  Total OUT: 575 mL    Total NET: -575 mL      12 Feb 2025 07:01  -  12 Feb 2025 13:51  --------------------------------------------------------  IN:    sodium chloride 0.9%: 225 mL  Total IN: 225 mL    OUT:  Total OUT: 0 mL    Total NET: 225 mL        ___________________________________________________  PHYSICAL EXAM      GENERAL: NAD  CHEST/LUNG: Equal chest rise b/l, non labored breathing   HEART: Regular rate and rhythm  ABDOMEN: Soft, Nontender, Nondistended;   EXTREMITIES:  No clubbing, cyanosis, or edema  BACK: Mepilex dressing in place C/D/I, back soft/flat, TAL drain with serosanguinous output    ___________________________________________________  LABS                        13.8   6.90  )-----------( 294      ( 12 Feb 2025 12:15 )             40.8     12 Feb 2025 12:15    139    |  103    |  10     ----------------------------<  112    4.6     |  30     |  0.8      Ca    9.1        12 Feb 2025 12:15    TPro  6.1    /  Alb  3.5    /  TBili  0.4    /  DBili  x      /  AST  68     /  ALT  36     /  AlkPhos  98     12 Feb 2025 12:15      CAPILLARY BLOOD GLUCOSE      POCT Blood Glucose.: 102 mg/dL (12 Feb 2025 11:43)        Urinalysis Basic - ( 12 Feb 2025 12:15 )    Color: x / Appearance: x / SG: x / pH: x  Gluc: 112 mg/dL / Ketone: x  / Bili: x / Urobili: x   Blood: x / Protein: x / Nitrite: x   Leuk Esterase: x / RBC: x / WBC x   Sq Epi: x / Non Sq Epi: x / Bacteria: x      ___________________________________________________  MICRO  Recent Cultures:    ___________________________________________________  MEDICATIONS  (STANDING):  acetaminophen     Tablet .. 1000 milliGRAM(s) Oral every 8 hours  bisacodyl Suppository 10 milliGRAM(s) Rectal daily  enoxaparin Injectable 40 milliGRAM(s) SubCutaneous every 24 hours  folic acid 1 milliGRAM(s) Oral daily  gabapentin 600 milliGRAM(s) Oral three times a day  influenza  Vaccine (HIGH DOSE) 0.5 milliLiter(s) IntraMuscular once  lactobacillus acidophilus 1 Tablet(s) Oral daily  levothyroxine 100 MICROGram(s) Oral daily  magnesium hydroxide Suspension 30 milliLiter(s) Oral daily  methocarbamol 750 milliGRAM(s) Oral every 8 hours  multivitamin 1 Tablet(s) Oral daily  pantoprazole    Tablet 40 milliGRAM(s) Oral before breakfast  polyethylene glycol 3350 17 Gram(s) Oral daily  senna 2 Tablet(s) Oral at bedtime  thiamine 100 milliGRAM(s) Oral daily    MEDICATIONS  (PRN):  acetaminophen 300 mG/butalbital 50 mG/ caffeine 40 mG 1 Capsule(s) Oral every 6 hours PRN heaaches  ondansetron Injectable 4 milliGRAM(s) IV Push every 6 hours PRN Nausea and/or Vomiting  oxyCODONE    IR 5 milliGRAM(s) Oral every 4 hours PRN Moderate Pain (4 - 6)  oxyCODONE    IR 10 milliGRAM(s) Oral every 4 hours PRN Severe Pain (7 - 10)

## 2025-02-12 NOTE — CONSULT NOTE ADULT - ASSESSMENT
75 yo domciled Male with hx of Chronic back pain, prostate ca in remission, hx of CVA, hypothyroidism and HLD and no signficant PPHx with no previous IPP hospitalizations, SA or NSSIB presenting to the hospital for lumbar stenosis s/p L2-3, L3-4, L4-5, L5-S1 bilateral laminectomy, foraminotomy, discectomy and closure. Addiction Medicine consulted for concern for AUD and alcohol withdrawal.     #Alcohol consumption:  - given history, patient does not meet criteria for AUD currently and symptoms not consistent with mild or moderate withdrawal at this time.   - s/p 200 mg librium in the past 24 hours due to placement on taper, with notable somnolence per primary team. Given long acting duration of librium, would recommend discontinuation of librium taper. If patient is to remain hospitalized, can be monitored for the next day on CIWA protocol only followed by discontinuation of monitoring. Low concern for worsening symptoms at day 5 of admission and over 1 week since last beverage.   - CATCH team spoke with patient and confirmed similar history. Patient not requesting or requiring substance use counseling program at this time.     #Kinetic Tremor:  - patient with tremor with voluntary movement, unclear at this time which subtype of kinetic tremor he has. Serum glucose and liver enzymes reviewed, within appropriate limits. Agree with thyroid labs. Overutilization of caffeine (which is a known risk factor for this patient's increased caffeine intake) is a significant factor and may be a contributing factor for this patient.  - patient will benefit from outpatient neurologic consult referral to deduce tremor type and appropriate treatment.  - would recommend discontinuation of propanolol at this time.     Thank you for this consult. Rest of care per primary. Addiction medicine will sign off. Please reach out with any questions or concerns via TEAMS.   
IMPRESSION: Rehab of lumbar stenosis / s/p L2-S1 laminectomy and wound closure with myocutaneous flap     PRECAUTIONS: [   ] Cardiac  [   ] Respiratory  [   ] Seizures [   ] Contact Isolation  [   ] Droplet Isolation  [   ] Other    Weight Bearing Status:     RECOMMENDATION:    Out of Bed to Chair     DVT/Decubiti Prophylaxis    REHAB PLAN:     [  x  ] Bedside P/T 3-5 times a week   [    ]   Bedside O/T  2-3 times a week             [    ] Speech Therapy               [    ]  No Rehab Therapy Indicated   Conditioning/ROM                                    ADL  Bed Mobility                                               Conditioning/ROM  Transfers                                                     Bed Mobility  Sitting /Standing Balance                         Transfers                                        Gait Training                                               Sitting/Standing Balance  Stair Training [   ]Applicable                    Home equipment Eval                                                                        Splinting  [   ] Only      GOALS:   ADL   [    ]   Independent                    Transfers  [  x  ] Independent                          Ambulation  [  x  ] Independent     [   x  ] With device                            [    ]  CG                                                         [    ]  CG                                                                  [    ] CG                            [    ] Min A                                                   [    ] Min A                                                              [    ] Min  A          DISCHARGE PLAN:   [    ]  Good candidate for Intensive Rehabilitation/Hospital based                                             Will tolerate 3hrs Intensive Rehab Daily                                       [     ]  Short Term Rehab in Skilled Nursing Facility                                       [   x  ]  Home with Outpatient or  services                                         [     ]  Possible Candidate for Intensive Hospital based Rehab

## 2025-02-13 ENCOUNTER — TRANSCRIPTION ENCOUNTER (OUTPATIENT)
Age: 75
End: 2025-02-13

## 2025-02-13 VITALS
RESPIRATION RATE: 18 BRPM | OXYGEN SATURATION: 97 % | TEMPERATURE: 98 F | SYSTOLIC BLOOD PRESSURE: 113 MMHG | HEART RATE: 78 BPM | DIASTOLIC BLOOD PRESSURE: 74 MMHG

## 2025-02-13 RX ORDER — MECOBAL/LEVOMEFOLAT CA/B6 PHOS 2-3-35 MG
1 TABLET ORAL
Qty: 0 | Refills: 0 | DISCHARGE
Start: 2025-02-13

## 2025-02-13 RX ORDER — POLYETHYLENE GLYCOL 3350 17 G/17G
17 POWDER, FOR SOLUTION ORAL
Qty: 0 | Refills: 0 | DISCHARGE
Start: 2025-02-13

## 2025-02-13 RX ORDER — FOLIC ACID 1 MG
1 TABLET ORAL
Qty: 0 | Refills: 0 | DISCHARGE
Start: 2025-02-13

## 2025-02-13 RX ORDER — MAGNESIUM HYDROXIDE 400 MG/5ML
30 SUSPENSION, ORAL (FINAL DOSE FORM) ORAL
Qty: 0 | Refills: 0 | DISCHARGE
Start: 2025-02-13

## 2025-02-13 RX ORDER — BISACODYL 5 MG
1 TABLET, DELAYED RELEASE (ENTERIC COATED) ORAL
Qty: 0 | Refills: 0 | DISCHARGE
Start: 2025-02-13

## 2025-02-13 RX ORDER — SENNOSIDES 8.6 MG
2 TABLET ORAL
Qty: 30 | Refills: 0
Start: 2025-02-13

## 2025-02-13 RX ORDER — OXYCODONE HYDROCHLORIDE 30 MG/1
1 TABLET ORAL
Qty: 30 | Refills: 0
Start: 2025-02-13

## 2025-02-13 RX ORDER — ACETAMINOPHEN 160 MG/5ML
2 SUSPENSION ORAL
Qty: 0 | Refills: 0 | DISCHARGE
Start: 2025-02-13

## 2025-02-13 RX ORDER — METHOCARBAMOL 500 MG
1 TABLET ORAL
Qty: 30 | Refills: 0
Start: 2025-02-13

## 2025-02-13 RX ORDER — THIAMINE HCL 100 MG
1 TABLET ORAL
Qty: 0 | Refills: 0 | DISCHARGE
Start: 2025-02-13

## 2025-02-13 RX ADMIN — Medication 750 MILLIGRAM(S): at 13:57

## 2025-02-13 RX ADMIN — Medication 1 TABLET(S): at 12:36

## 2025-02-13 RX ADMIN — POLYETHYLENE GLYCOL 3350 17 GRAM(S): 17 POWDER, FOR SOLUTION ORAL at 05:34

## 2025-02-13 RX ADMIN — LEVOTHYROXINE SODIUM 100 MICROGRAM(S): 25 TABLET ORAL at 05:34

## 2025-02-13 RX ADMIN — Medication 750 MILLIGRAM(S): at 05:34

## 2025-02-13 RX ADMIN — ACETAMINOPHEN 1000 MILLIGRAM(S): 160 SUSPENSION ORAL at 17:06

## 2025-02-13 RX ADMIN — GABAPENTIN 600 MILLIGRAM(S): 800 TABLET ORAL at 05:34

## 2025-02-13 RX ADMIN — Medication 100 MILLIGRAM(S): at 12:36

## 2025-02-13 RX ADMIN — PANTOPRAZOLE 40 MILLIGRAM(S): 20 TABLET, DELAYED RELEASE ORAL at 05:34

## 2025-02-13 RX ADMIN — ACETAMINOPHEN 1000 MILLIGRAM(S): 160 SUSPENSION ORAL at 05:33

## 2025-02-13 RX ADMIN — Medication 1 MILLIGRAM(S): at 12:36

## 2025-02-13 RX ADMIN — ENOXAPARIN SODIUM 40 MILLIGRAM(S): 100 INJECTION SUBCUTANEOUS at 12:36

## 2025-02-13 RX ADMIN — GABAPENTIN 600 MILLIGRAM(S): 800 TABLET ORAL at 13:57

## 2025-02-13 RX ADMIN — Medication 30 MILLILITER(S): at 05:33

## 2025-02-13 RX ADMIN — ACETAMINOPHEN 1000 MILLIGRAM(S): 160 SUSPENSION ORAL at 13:57

## 2025-02-13 RX ADMIN — ACETAMINOPHEN 1000 MILLIGRAM(S): 160 SUSPENSION ORAL at 06:03

## 2025-02-13 NOTE — DISCHARGE NOTE PROVIDER - NSDCFUADDINST_GEN_ALL_CORE_FT
Keep incision clean and dry. May shower and get incision wet. No baths/hottubs/pool. Do not submerge the incision

## 2025-02-13 NOTE — CHART NOTE - NSCHARTNOTESELECT_GEN_ALL_CORE
Neurosurgery/Event Note
Neurosurgery/Event Note
PACU Note
Event Note
Medicine signoff/Event Note
Post op check/Event Note

## 2025-02-13 NOTE — DISCHARGE NOTE PROVIDER - NSDCMRMEDTOKEN_GEN_ALL_CORE_FT
acetaminophen 500 mg oral tablet: 2 tab(s) orally every 8 hours as needed for  mild pain  bisacodyl 10 mg rectal suppository: 1 suppository(ies) rectal once a day  folic acid 1 mg oral tablet: 1 tab(s) orally once a day  gabapentin 600 mg oral tablet: 1 tab(s) orally 3 times a day  magnesium hydroxide 8% oral suspension: 30 milliliter(s) orally 2 times a day  methocarbamol 750 mg oral tablet: 1 tab(s) orally every 8 hours as needed for  muscle spasm MDD: 3  Multiple Vitamins oral tablet: 1 tab(s) orally once a day  oxyCODONE 5 mg oral tablet: 1 tab(s) orally every 6 hours as needed for Moderate Pain (4 - 6) 1-2 tabs every 6 hours as needed for pain MDD: 6  polyethylene glycol 3350 oral powder for reconstitution: 17 gram(s) orally 2 times a day  senna leaf extract oral tablet: 2 tab(s) orally once a day (at bedtime)  Synthroid 100 mcg (0.1 mg) oral tablet: 1 tab(s) orally once a day  thiamine 100 mg oral tablet: 1 tab(s) orally once a day

## 2025-02-13 NOTE — CHART NOTE - NSCHARTNOTEFT_GEN_A_CORE
ARJUN NSX service. Pt planned for DC today, as per NSX, Medicine ok to Sign off
Post Operative Note  Patient: DARON ORTEGA 74y (1950) Male   MRN: [id number]  Location: [address]  Visit: 02-07-25 Inpatient  Date: 02-07-25 @ 22:25    Procedure: S/P closure of spinal surgical wound w/ myocutaneous flap    Subjective:   Nausea: no, Vomiting:  no, Ambulating:  no, Flatus:  no  Pain Assessment: yes  , Location: Low back, Pain Intensity: 3 /10 , Quality:  Sore    Objective:  Vitals: T(F): 98.8 (02-07-25 @ 20:42), Max: 98.8 (02-07-25 @ 20:42)  HR: 92 (02-07-25 @ 20:42)  BP: 112/63 (02-07-25 @ 20:42) (109/70 - 165/80)  RR: 18 (02-07-25 @ 20:42)  SpO2: 98% (02-07-25 @ 20:42)  Vent Settings:     In:   02-07-25 @ 07:01  -  02-07-25 @ 22:25  --------------------------------------------------------  IN: 225 mL      IV Fluids: sodium chloride 0.9%. 1000 milliLiter(s) (75 mL/Hr) IV Continuous <Continuous>      Out:   02-07-25 @ 07:01  -  02-07-25 @ 22:25  --------------------------------------------------------  OUT: 90 mL      EBL:     Voided Urine:   02-07-25 @ 07:01  -  02-07-25 @ 22:25  --------------------------------------------------------  OUT: 90 mL      Lovelace Catheter: yes no   Drains:   TAL:   02-07-25 @ 07:01  -  02-07-25 @ 22:25  --------------------------------------------------------  OUT: 90 mL     ,   Chest Tube:      NG Tube:       Physical Examination:  General Appearance: NAD  HEENT: EOMI, sclera non-icteric.  Heart: RRR  Lungs: equal chest rise b/l  Abdomen:  Soft, nontender, nondistended. No rigidity, guarding, or rebound tenderness.   MSK/Extremities: Warm & well-perfused. Peripheral pulses intact.  Skin: Warm, dry. No jaundice.   Back: Mepilex dressing in place c/d/i, TAL drain in place draining sanguinous output    Medications: [Standing]  acetaminophen     Tablet .. 1000 milliGRAM(s) Oral every 8 hours  ceFAZolin   IVPB 1000 milliGRAM(s) IV Intermittent every 8 hours  gabapentin 600 milliGRAM(s) Oral three times a day  influenza  Vaccine (HIGH DOSE) 0.5 milliLiter(s) IntraMuscular once  levothyroxine 100 MICROGram(s) Oral daily  methocarbamol 750 milliGRAM(s) Oral every 8 hours  ondansetron Injectable 4 milliGRAM(s) IV Push every 6 hours PRN  oxyCODONE    IR 5 milliGRAM(s) Oral every 4 hours PRN  oxyCODONE    IR 10 milliGRAM(s) Oral every 4 hours PRN  pantoprazole    Tablet 40 milliGRAM(s) Oral before breakfast  polyethylene glycol 3350 17 Gram(s) Oral daily  senna 2 Tablet(s) Oral at bedtime  sodium chloride 0.9%. 1000 milliLiter(s) IV Continuous <Continuous>    Medications: [PRN]  acetaminophen     Tablet .. 1000 milliGRAM(s) Oral every 8 hours  ceFAZolin   IVPB 1000 milliGRAM(s) IV Intermittent every 8 hours  gabapentin 600 milliGRAM(s) Oral three times a day  influenza  Vaccine (HIGH DOSE) 0.5 milliLiter(s) IntraMuscular once  levothyroxine 100 MICROGram(s) Oral daily  methocarbamol 750 milliGRAM(s) Oral every 8 hours  ondansetron Injectable 4 milliGRAM(s) IV Push every 6 hours PRN  oxyCODONE    IR 5 milliGRAM(s) Oral every 4 hours PRN  oxyCODONE    IR 10 milliGRAM(s) Oral every 4 hours PRN  pantoprazole    Tablet 40 milliGRAM(s) Oral before breakfast  polyethylene glycol 3350 17 Gram(s) Oral daily  senna 2 Tablet(s) Oral at bedtime  sodium chloride 0.9%. 1000 milliLiter(s) IV Continuous <Continuous>    Labs:                        14.8   14.85 )-----------( 320      ( 07 Feb 2025 16:34 )             43.5     02-07    137  |  103  |  10  ----------------------------<  132[H]  4.2   |  25  |  0.9    Ca    9.1      07 Feb 2025 16:34    TPro  6.3  /  Alb  4.1  /  TBili  0.5  /  DBili  x   /  AST  21  /  ALT  20  /  AlkPhos  81  02-07          Imaging:  No post-op imaging studies    Assessment:  74yMale patient S/P closure of spinal surgical wound w/ myocutaneous flap    Plan:  Dash diet, maintain IVF hydration  monitor TAL drain output q12 hrs and q24hrs   Strict I/Os - will continue to monitor UOP  Continue GI regimen - Protonix  Continue DVT ppx - SCD  Antiemetics/pain control PRN        Date/Time: 02-07-25 @ 22:25
THE PATIENT WILL REQUIRE A ROLLING WALKER AT HOME DUE TO THEIR DX OF Neurogenic claudication due to lumbar spinal stenosis TO HELP COMPLETE THE MRADLS. THE PATIENT IS ABLE TO SAFELY USE THE WALKER, THE PATIENTS MOBILITY DEFICIT CAN BE SUFFICENTLY RESOLVED WITH THE USE PF A WALKER
LETTER OF MEDICAL NECESSITY  Patient is a 74 year old male with pmhx HTN, hypothyroidism, s/p L2-S1 laminectomy with plastics closure. The pt has a mobility limitation that significantly impairs the pts ability to participate in one or more MRADLs such as toileting, eating, dressing, and bathing in customary locations in the home. The pts home provides adequate access between rooms for the use of the rolling walker.  The rolling walker will significantly improve the pts ability to participate in MRADLs and will be used on a regular basis in the home.
PACU ANESTHESIA ADMISSION NOTE      Procedure: Laminectomy, spine, lumbar, 4 levels, with discectomy    Myocutaneous flap of back      Post op diagnosis:  Neurogenic claudication due to lumbar spinal stenosis        ____  Intubated  TV:______       Rate: ______      FiO2: ______    _x___  Patent Airway    _x___  Full return of protective reflexes    _x___  Full recovery from anesthesia / back to baseline status    Vitals:  T(F): 98.2   HR: 92  BP: 129/77  RR: 21  SpO2: 95%    Mental Status:  _x___ Awake   __x___ Alert   _____ Drowsy   _____ Sedated    Nausea/Vomiting:  _x___  NO       ______Yes,   See Post - Op Orders         Pain Scale (0-10):  __0___    Treatment: _x___ None    ____ See Post - Op/PCA Orders    Post - Operative Fluids:   __x__ Oral   ____ See Post - Op Orders    Plan: Discharge:   ____Home       __x___Floor     _____Critical Care    _____  Other:_________________    Comments:  No anesthesia issues or complications noted.  Discharge when criteria met.
Surgical Drain Discontinued.    Minimal OP in TAL, drain site was cleaned with antiseptic then the drain was withdrawn in a slow and steady fashion until DCd in entirety.  No complications, pt tolerated well.    Plan d.w Dr Guillory

## 2025-02-13 NOTE — PROGRESS NOTE ADULT - PROVIDER SPECIALTY LIST ADULT
Neurosurgery
Neurosurgery
Hospitalist
Neurosurgery
Plastic Surgery
Hospitalist
Hospitalist
Neurosurgery
Neurosurgery
Plastic Surgery
Physiatry

## 2025-02-13 NOTE — PROGRESS NOTE ADULT - SUBJECTIVE AND OBJECTIVE BOX
HPI: Pt seen and examined at bedside in 4C. Pt states surgical site pain improved postoperatively. No c/o weakness,  numbness, paresthesias at this time. +BM, voiding freely.     OVERNIGHT EVENTS: NAEON    Vital Signs Last 24 Hrs  T(C): 36.8 (13 Feb 2025 08:05), Max: 37.4 (13 Feb 2025 00:30)  T(F): 98.2 (13 Feb 2025 08:05), Max: 99.3 (13 Feb 2025 00:30)  HR: 78 (13 Feb 2025 08:05) (74 - 89)  BP: 113/74 (13 Feb 2025 08:05) (110/67 - 115/68)  BP(mean): --  RR: 18 (13 Feb 2025 08:05) (18 - 18)  SpO2: 97% (13 Feb 2025 08:05) (97% - 98%)        I&O's Summary    12 Feb 2025 07:01  -  13 Feb 2025 07:00  --------------------------------------------------------  IN: 225 mL / OUT: 850 mL / NET: -625 mL    13 Feb 2025 07:01  -  13 Feb 2025 11:05  --------------------------------------------------------  IN: 390 mL / OUT: 250 mL / NET: 140 mL        PHYSICAL EXAM:  Awake, alert, following commands  MAEX4  5/5 strength b/l UE  5/5 strength b/l LE  SILT in b/l UE and LE  Incision/Wound: c/d/i       LABS:                        13.8   6.90  )-----------( 294      ( 12 Feb 2025 12:15 )             40.8     02-12    139  |  103  |  10  ----------------------------<  112[H]  4.6   |  30  |  0.8    Ca    9.1      12 Feb 2025 12:15    TPro  6.1  /  Alb  3.5  /  TBili  0.4  /  DBili  x   /  AST  68[H]  /  ALT  36  /  AlkPhos  98  02-12      Urinalysis Basic - ( 12 Feb 2025 12:15 )    Color: x / Appearance: x / SG: x / pH: x  Gluc: 112 mg/dL / Ketone: x  / Bili: x / Urobili: x   Blood: x / Protein: x / Nitrite: x   Leuk Esterase: x / RBC: x / WBC x   Sq Epi: x / Non Sq Epi: x / Bacteria: x          CAPILLARY BLOOD GLUCOSE      POCT Blood Glucose.: 102 mg/dL (12 Feb 2025 11:43)        Allergies    contrast media (iodine-based) (Unknown)    Intolerances      MEDICATIONS:  Antibiotics:    Neuro:  acetaminophen     Tablet .. 1000 milliGRAM(s) Oral every 8 hours  acetaminophen 300 mG/butalbital 50 mG/ caffeine 40 mG 1 Capsule(s) Oral every 6 hours PRN  gabapentin 600 milliGRAM(s) Oral three times a day  methocarbamol 750 milliGRAM(s) Oral every 8 hours  ondansetron Injectable 4 milliGRAM(s) IV Push every 6 hours PRN  oxyCODONE    IR 5 milliGRAM(s) Oral every 4 hours PRN  oxyCODONE    IR 10 milliGRAM(s) Oral every 4 hours PRN    Anticoagulation:  enoxaparin Injectable 40 milliGRAM(s) SubCutaneous every 24 hours    OTHER:  bisacodyl Suppository 10 milliGRAM(s) Rectal daily  influenza  Vaccine (HIGH DOSE) 0.5 milliLiter(s) IntraMuscular once  lactobacillus acidophilus 1 Tablet(s) Oral daily  levothyroxine 100 MICROGram(s) Oral daily  magnesium hydroxide Suspension 30 milliLiter(s) Oral two times a day  pantoprazole    Tablet 40 milliGRAM(s) Oral before breakfast  polyethylene glycol 3350 17 Gram(s) Oral two times a day  senna 2 Tablet(s) Oral at bedtime    IVF:  folic acid 1 milliGRAM(s) Oral daily  multivitamin 1 Tablet(s) Oral daily  thiamine 100 milliGRAM(s) Oral daily    CULTURES:    RADIOLOGY & ADDITIONAL TESTS:      ASSESSMENT:  74y M S/P L2-3, L3-4, L4-5, L5-S1 Bilateral laminectomy with closure of spinal surgical wound w/ myocutaneous flap on 2/7    PLAN:  - Pain control prn   - DVT ppx: Lovenox   - Encouraged incentive spirometry  - PT/OT/Rehab  - Hospitalist for comanagement- cleared  for d/c   - Addiction Medicine: D/w Dr Raymond, pt cleared from Addiction team standpoint does not require outpt f/u   - Plastics: Will follow up outpt with Dr Olmos   - D/W attending

## 2025-02-13 NOTE — DISCHARGE NOTE PROVIDER - CARE PROVIDER_API CALL
Rashad Guillory  Neurosurgery  [address] [place] [address], [address]  [place] [address]  Phone: [phone]  Fax: [phone]  Follow Up Time: 1 week    Jd Olmos  Plastic Surgery  [address] [place] [address], [address]  [place] [address]  Phone: [phone]  Fax: [phone]  Follow Up Time: 1 week

## 2025-02-13 NOTE — DISCHARGE NOTE PROVIDER - HOSPITAL COURSE
74 year old male admitted 2.7.25 S/P L2-3, L3-4, L4-5, L5-S1 Bilateral laminectomy with plastics closure. He had a Drain placed intraoperatively. He did well postop with improvement. He was started on Lovenox on POD#1. He ambulated with physical therapy and did well. On POD#3 he was noted to have tremors in his hands. He was started on Propanolol with PRN Ativan/Librium, Thiamine and Folic Acid for potential alcohol withdrawal. He was also started on Fioricet PRN for headaches. On POD#4 his drain was removed. On POD#5 he was seen by the addiction medicine team for alcohol use. They recommended stopping the Propanolol and referred him to outpatient follow up. He was discharged home on 2.13.25

## 2025-02-13 NOTE — DISCHARGE NOTE PROVIDER - NSDCCPTREATMENT_GEN_ALL_CORE_FT
PRINCIPAL PROCEDURE  Procedure: Laminectomy, spine, lumbar, 4 levels, with discectomy  Findings and Treatment:       SECONDARY PROCEDURE  Procedure: Myocutaneous flap of back  Findings and Treatment:

## 2025-02-13 NOTE — DISCHARGE NOTE PROVIDER - PROVIDER TOKENS
PROVIDER:[TOKEN:[[id number]],FOLLOWUP:[1 week]],PROVIDER:[TOKEN:[[id number]],FOLLOWUP:[1 week]]

## 2025-02-18 ENCOUNTER — APPOINTMENT (OUTPATIENT)
Dept: NEUROSURGERY | Facility: CLINIC | Age: 75
End: 2025-02-18
Payer: MEDICARE

## 2025-02-18 ENCOUNTER — NON-APPOINTMENT (OUTPATIENT)
Age: 75
End: 2025-02-18

## 2025-02-18 VITALS — WEIGHT: 167 LBS | HEIGHT: 70 IN | BODY MASS INDEX: 23.91 KG/M2

## 2025-02-18 DIAGNOSIS — Z09 ENCOUNTER FOR FOLLOW-UP EXAMINATION AFTER COMPLETED TREATMENT FOR CONDITIONS OTHER THAN MALIGNANT NEOPLASM: ICD-10-CM

## 2025-02-18 PROCEDURE — 99024 POSTOP FOLLOW-UP VISIT: CPT

## 2025-02-19 ENCOUNTER — APPOINTMENT (OUTPATIENT)
Dept: PLASTIC SURGERY | Facility: CLINIC | Age: 75
End: 2025-02-19
Payer: MEDICARE

## 2025-02-19 VITALS — WEIGHT: 167 LBS | HEIGHT: 70 IN | BODY MASS INDEX: 23.91 KG/M2

## 2025-02-19 DIAGNOSIS — M43.06 SPONDYLOLYSIS, LUMBAR REGION: ICD-10-CM

## 2025-02-19 DIAGNOSIS — D72.829 ELEVATED WHITE BLOOD CELL COUNT, UNSPECIFIED: ICD-10-CM

## 2025-02-19 DIAGNOSIS — I10 ESSENTIAL (PRIMARY) HYPERTENSION: ICD-10-CM

## 2025-02-19 DIAGNOSIS — R51.9 HEADACHE, UNSPECIFIED: ICD-10-CM

## 2025-02-19 DIAGNOSIS — G97.41 ACCIDENTAL PUNCTURE OR LACERATION OF DURA DURING A PROCEDURE: ICD-10-CM

## 2025-02-19 DIAGNOSIS — M48.062 SPINAL STENOSIS, LUMBAR REGION WITH NEUROGENIC CLAUDICATION: ICD-10-CM

## 2025-02-19 DIAGNOSIS — G25.2 OTHER SPECIFIED FORMS OF TREMOR: ICD-10-CM

## 2025-02-19 DIAGNOSIS — Z86.73 PERSONAL HISTORY OF TRANSIENT ISCHEMIC ATTACK (TIA), AND CEREBRAL INFARCTION WITHOUT RESIDUAL DEFICITS: ICD-10-CM

## 2025-02-19 DIAGNOSIS — E78.5 HYPERLIPIDEMIA, UNSPECIFIED: ICD-10-CM

## 2025-02-19 DIAGNOSIS — E03.9 HYPOTHYROIDISM, UNSPECIFIED: ICD-10-CM

## 2025-02-19 DIAGNOSIS — F10.930 ALCOHOL USE, UNSPECIFIED WITH WITHDRAWAL, UNCOMPLICATED: ICD-10-CM
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
## 2025-02-19 DIAGNOSIS — Z87.891 PERSONAL HISTORY OF NICOTINE DEPENDENCE: ICD-10-CM

## 2025-02-19 DIAGNOSIS — M48.07 SPINAL STENOSIS, LUMBOSACRAL REGION: ICD-10-CM
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
## 2025-02-19 DIAGNOSIS — Z79.890 HORMONE REPLACEMENT THERAPY: ICD-10-CM

## 2025-02-19 DIAGNOSIS — Z91.041 RADIOGRAPHIC DYE ALLERGY STATUS: ICD-10-CM

## 2025-02-19 DIAGNOSIS — Z79.899 OTHER LONG TERM (CURRENT) DRUG THERAPY: ICD-10-CM

## 2025-02-19 DIAGNOSIS — Z85.46 PERSONAL HISTORY OF MALIGNANT NEOPLASM OF PROSTATE: ICD-10-CM

## 2025-02-19 DIAGNOSIS — M54.16 RADICULOPATHY, LUMBAR REGION: ICD-10-CM

## 2025-02-19 DIAGNOSIS — F15.93 OTHER STIMULANT USE, UNSPECIFIED WITH WITHDRAWAL: ICD-10-CM

## 2025-02-19 DIAGNOSIS — M47.816 SPONDYLOSIS W/OUT MYELOPATHY OR RADICULOPATHY, LUMBAR REGION: ICD-10-CM

## 2025-02-19 PROCEDURE — 99202 OFFICE O/P NEW SF 15 MIN: CPT

## 2025-02-25 PROBLEM — Z09 POSTOP CHECK: Status: ACTIVE | Noted: 2025-02-25

## 2025-02-25 RX ORDER — OXYCODONE 5 MG/1
5 TABLET ORAL EVERY 6 HOURS
Qty: 28 | Refills: 0 | Status: ACTIVE | COMMUNITY
Start: 2025-02-25 | End: 1900-01-01

## 2025-02-25 RX ORDER — METHOCARBAMOL 750 MG/1
750 TABLET, FILM COATED ORAL EVERY 6 HOURS
Qty: 84 | Refills: 0 | Status: ACTIVE | COMMUNITY
Start: 2025-02-25 | End: 1900-01-01

## 2025-03-18 ENCOUNTER — APPOINTMENT (OUTPATIENT)
Dept: NEUROSURGERY | Facility: CLINIC | Age: 75
End: 2025-03-18
Payer: MEDICARE

## 2025-03-18 VITALS — WEIGHT: 167 LBS | BODY MASS INDEX: 23.91 KG/M2 | HEIGHT: 70 IN

## 2025-03-18 DIAGNOSIS — Z86.73 PERSONAL HISTORY OF TRANSIENT ISCHEMIC ATTACK (TIA), AND CEREBRAL INFARCTION W/OUT RESIDUAL DEFICITS: ICD-10-CM

## 2025-03-18 DIAGNOSIS — Z86.59 PERSONAL HISTORY OF OTHER MENTAL AND BEHAVIORAL DISORDERS: ICD-10-CM

## 2025-03-18 DIAGNOSIS — Z87.19 PERSONAL HISTORY OF OTHER DISEASES OF THE DIGESTIVE SYSTEM: ICD-10-CM

## 2025-03-18 DIAGNOSIS — Z87.39 PERSONAL HISTORY OF OTHER DISEASES OF THE MUSCULOSKELETAL SYSTEM AND CONNECTIVE TISSUE: ICD-10-CM

## 2025-03-18 DIAGNOSIS — Z09 ENCOUNTER FOR FOLLOW-UP EXAMINATION AFTER COMPLETED TREATMENT FOR CONDITIONS OTHER THAN MALIGNANT NEOPLASM: ICD-10-CM

## 2025-03-18 DIAGNOSIS — Z85.46 PERSONAL HISTORY OF MALIGNANT NEOPLASM OF PROSTATE: ICD-10-CM

## 2025-03-18 DIAGNOSIS — Z82.3 FAMILY HISTORY OF STROKE: ICD-10-CM

## 2025-03-18 DIAGNOSIS — Z80.9 FAMILY HISTORY OF MALIGNANT NEOPLASM, UNSPECIFIED: ICD-10-CM

## 2025-03-18 DIAGNOSIS — Z82.49 FAMILY HISTORY OF ISCHEMIC HEART DISEASE AND OTHER DISEASES OF THE CIRCULATORY SYSTEM: ICD-10-CM

## 2025-03-18 PROCEDURE — 99024 POSTOP FOLLOW-UP VISIT: CPT

## 2025-04-22 ENCOUNTER — APPOINTMENT (OUTPATIENT)
Dept: NEUROSURGERY | Facility: CLINIC | Age: 75
End: 2025-04-22
Payer: MEDICARE

## 2025-04-22 ENCOUNTER — NON-APPOINTMENT (OUTPATIENT)
Age: 75
End: 2025-04-22

## 2025-04-22 VITALS — BODY MASS INDEX: 23.77 KG/M2 | HEIGHT: 70 IN | WEIGHT: 166 LBS

## 2025-04-22 DIAGNOSIS — Z09 ENCOUNTER FOR FOLLOW-UP EXAMINATION AFTER COMPLETED TREATMENT FOR CONDITIONS OTHER THAN MALIGNANT NEOPLASM: ICD-10-CM

## 2025-04-22 PROCEDURE — 99024 POSTOP FOLLOW-UP VISIT: CPT

## 2025-05-06 ENCOUNTER — TRANSCRIPTION ENCOUNTER (OUTPATIENT)
Age: 75
End: 2025-05-06

## 2025-05-06 ENCOUNTER — APPOINTMENT (OUTPATIENT)
Dept: NEUROLOGY | Facility: CLINIC | Age: 75
End: 2025-05-06
Payer: MEDICARE

## 2025-05-06 VITALS
BODY MASS INDEX: 22.9 KG/M2 | HEART RATE: 73 BPM | HEIGHT: 70 IN | DIASTOLIC BLOOD PRESSURE: 82 MMHG | WEIGHT: 160 LBS | SYSTOLIC BLOOD PRESSURE: 128 MMHG

## 2025-05-06 DIAGNOSIS — G20.C PARKINSONISM, UNSPECIFIED: ICD-10-CM

## 2025-05-06 DIAGNOSIS — R41.89 OTHER SYMPTOMS AND SIGNS INVOLVING COGNITIVE FUNCTIONS AND AWARENESS: ICD-10-CM

## 2025-05-06 DIAGNOSIS — F10.20 ALCOHOL DEPENDENCE, UNCOMPLICATED: ICD-10-CM

## 2025-05-06 PROCEDURE — G2211 COMPLEX E/M VISIT ADD ON: CPT

## 2025-05-06 PROCEDURE — 99204 OFFICE O/P NEW MOD 45 MIN: CPT

## 2025-05-06 RX ORDER — LEVOTHYROXINE SODIUM 137 UG/1
TABLET ORAL
Refills: 0 | Status: ACTIVE | COMMUNITY

## 2025-05-07 ENCOUNTER — APPOINTMENT (OUTPATIENT)
Dept: NEUROLOGY | Facility: CLINIC | Age: 75
End: 2025-05-07
Payer: MEDICARE

## 2025-05-07 PROCEDURE — 95816 EEG AWAKE AND DROWSY: CPT

## 2025-05-07 NOTE — ED PROVIDER NOTE - DISPOSITION TYPE
Per provider ok to switch from Farxiga to Jarciance. Jardiance 10 mg every day is fine. # 90 with 3 refills     Script sent to pharmacy    Linda Pizarro RN, BSN   Nephrology RN Care Coordinator   [facility]      DISCHARGE

## 2025-05-08 PROBLEM — F10.20 CHRONIC ALCOHOLISM: Status: ACTIVE | Noted: 2025-05-08

## 2025-06-06 ENCOUNTER — APPOINTMENT (OUTPATIENT)
Dept: NEUROLOGY | Facility: CLINIC | Age: 75
End: 2025-06-06
Payer: MEDICARE

## 2025-06-06 VITALS
HEIGHT: 70 IN | DIASTOLIC BLOOD PRESSURE: 72 MMHG | BODY MASS INDEX: 22.9 KG/M2 | WEIGHT: 160 LBS | SYSTOLIC BLOOD PRESSURE: 111 MMHG

## 2025-06-06 PROCEDURE — G2211 COMPLEX E/M VISIT ADD ON: CPT

## 2025-06-06 PROCEDURE — 99214 OFFICE O/P EST MOD 30 MIN: CPT

## 2025-06-16 ENCOUNTER — OUTPATIENT (OUTPATIENT)
Dept: OUTPATIENT SERVICES | Facility: HOSPITAL | Age: 75
LOS: 1 days | End: 2025-06-16
Payer: MEDICARE

## 2025-06-16 DIAGNOSIS — Z98.890 OTHER SPECIFIED POSTPROCEDURAL STATES: Chronic | ICD-10-CM

## 2025-06-16 DIAGNOSIS — G20.C PARKINSONISM, UNSPECIFIED: ICD-10-CM

## 2025-06-16 PROCEDURE — A9584: CPT

## 2025-06-16 PROCEDURE — 78803 RP LOCLZJ TUM SPECT 1 AREA: CPT

## 2025-06-16 PROCEDURE — 78803 RP LOCLZJ TUM SPECT 1 AREA: CPT | Mod: 26

## 2025-06-17 DIAGNOSIS — G20.C PARKINSONISM, UNSPECIFIED: ICD-10-CM

## 2025-07-29 ENCOUNTER — APPOINTMENT (OUTPATIENT)
Dept: NEUROSURGERY | Facility: CLINIC | Age: 75
End: 2025-07-29
Payer: MEDICARE

## 2025-07-29 VITALS — WEIGHT: 160 LBS | BODY MASS INDEX: 22.9 KG/M2 | HEIGHT: 70 IN

## 2025-07-29 DIAGNOSIS — Z09 ENCOUNTER FOR FOLLOW-UP EXAMINATION AFTER COMPLETED TREATMENT FOR CONDITIONS OTHER THAN MALIGNANT NEOPLASM: ICD-10-CM

## 2025-07-29 PROCEDURE — 99212 OFFICE O/P EST SF 10 MIN: CPT

## 2025-08-08 ENCOUNTER — APPOINTMENT (OUTPATIENT)
Dept: NEUROLOGY | Facility: CLINIC | Age: 75
End: 2025-08-08
Payer: MEDICARE

## 2025-08-08 DIAGNOSIS — F32.A DEPRESSION, UNSPECIFIED: ICD-10-CM

## 2025-08-08 DIAGNOSIS — F10.20 ALCOHOL DEPENDENCE, UNCOMPLICATED: ICD-10-CM

## 2025-08-08 DIAGNOSIS — R41.89 OTHER SYMPTOMS AND SIGNS INVOLVING COGNITIVE FUNCTIONS AND AWARENESS: ICD-10-CM

## 2025-08-08 DIAGNOSIS — G20.C PARKINSONISM, UNSPECIFIED: ICD-10-CM

## 2025-08-08 PROCEDURE — 99214 OFFICE O/P EST MOD 30 MIN: CPT

## 2025-08-09 PROBLEM — F32.A DEPRESSION, UNSPECIFIED DEPRESSION TYPE: Status: ACTIVE | Noted: 2024-07-02

## 2025-08-11 ENCOUNTER — APPOINTMENT (OUTPATIENT)
Dept: NEUROPSYCHOLOGY | Facility: CLINIC | Age: 75
End: 2025-08-11

## 2025-09-12 ENCOUNTER — APPOINTMENT (OUTPATIENT)
Dept: NEUROPSYCHOLOGY | Facility: CLINIC | Age: 75
End: 2025-09-12